# Patient Record
Sex: FEMALE | Race: WHITE | Employment: FULL TIME | URBAN - METROPOLITAN AREA
[De-identification: names, ages, dates, MRNs, and addresses within clinical notes are randomized per-mention and may not be internally consistent; named-entity substitution may affect disease eponyms.]

---

## 2017-04-10 ENCOUNTER — OFFICE VISIT (OUTPATIENT)
Dept: PEDIATRICS | Facility: CLINIC | Age: 52
End: 2017-04-10
Payer: COMMERCIAL

## 2017-04-10 VITALS
OXYGEN SATURATION: 99 % | TEMPERATURE: 97.5 F | SYSTOLIC BLOOD PRESSURE: 120 MMHG | BODY MASS INDEX: 25.99 KG/M2 | HEART RATE: 57 BPM | HEIGHT: 65 IN | WEIGHT: 156 LBS | DIASTOLIC BLOOD PRESSURE: 80 MMHG

## 2017-04-10 DIAGNOSIS — Z11.59 NEED FOR HEPATITIS C SCREENING TEST: ICD-10-CM

## 2017-04-10 DIAGNOSIS — Z13.6 CARDIOVASCULAR SCREENING; LDL GOAL LESS THAN 160: ICD-10-CM

## 2017-04-10 DIAGNOSIS — Z12.11 COLON CANCER SCREENING: ICD-10-CM

## 2017-04-10 DIAGNOSIS — N64.4 BREAST PAIN, RIGHT: ICD-10-CM

## 2017-04-10 DIAGNOSIS — Z00.00 ROUTINE GENERAL MEDICAL EXAMINATION AT A HEALTH CARE FACILITY: Primary | ICD-10-CM

## 2017-04-10 DIAGNOSIS — A60.04 HERPES SIMPLEX VULVOVAGINITIS: ICD-10-CM

## 2017-04-10 DIAGNOSIS — Z13.1 SCREENING FOR DIABETES MELLITUS: ICD-10-CM

## 2017-04-10 LAB
CHOLEST SERPL-MCNC: 167 MG/DL
GLUCOSE SERPL-MCNC: 96 MG/DL (ref 70–99)
HCV AB SERPL QL IA: NORMAL
HDLC SERPL-MCNC: 73 MG/DL
LDLC SERPL CALC-MCNC: 75 MG/DL
NONHDLC SERPL-MCNC: 94 MG/DL
TRIGL SERPL-MCNC: 97 MG/DL

## 2017-04-10 PROCEDURE — 36415 COLL VENOUS BLD VENIPUNCTURE: CPT | Performed by: INTERNAL MEDICINE

## 2017-04-10 PROCEDURE — 99396 PREV VISIT EST AGE 40-64: CPT | Performed by: INTERNAL MEDICINE

## 2017-04-10 PROCEDURE — 82947 ASSAY GLUCOSE BLOOD QUANT: CPT | Performed by: INTERNAL MEDICINE

## 2017-04-10 PROCEDURE — 80061 LIPID PANEL: CPT | Performed by: INTERNAL MEDICINE

## 2017-04-10 PROCEDURE — 86803 HEPATITIS C AB TEST: CPT | Performed by: INTERNAL MEDICINE

## 2017-04-10 RX ORDER — VALACYCLOVIR HYDROCHLORIDE 500 MG/1
500 TABLET, FILM COATED ORAL 2 TIMES DAILY
Qty: 6 TABLET | Refills: 3 | Status: SHIPPED | OUTPATIENT
Start: 2017-04-10 | End: 2019-05-20

## 2017-04-10 NOTE — MR AVS SNAPSHOT
After Visit Summary   4/10/2017    Libra Pena    MRN: [id number]           Patient Information     Date Of Birth          1965        Visit Information        Provider Department      4/10/2017 7:40 AM Carmelina Sims MD [facility]        Today's Diagnoses     Routine general medical examination at a health care facility    -  1    Colon cancer screening        Screening for diabetes mellitus        CARDIOVASCULAR SCREENING; LDL GOAL LESS THAN 160        Need for hepatitis C screening test        Breast pain, right        Herpes simplex vulvovaginitis          Care Instructions    Make appointment(s) for:   -- mammogram and ultrasound  -- get labs done today (can  FIT testing kit at the lab as well)         Medication(s) prescribed today:    Orders Placed This Encounter   Medications     valACYclovir (VALTREX) 500 MG tablet     Sig: Take 1 tablet (500 mg) by mouth 2 times daily     Dispense:  6 tablet     Refill:  3     Please put on file. Do not fill until patient calls.                Preventive Health Recommendations  Female Ages 50 - 64    Yearly exam: See your health care provider every year in order to  o Review health changes.   o Discuss preventive care.    o Review your medicines if your doctor has prescribed any.      Get a Pap test every three years (unless you have an abnormal result and your provider advises testing more often).    If you get Pap tests with HPV test, you only need to test every 5 years, unless you have an abnormal result.     You do not need a Pap test if your uterus was removed (hysterectomy) and you have not had cancer.    You should be tested each year for STDs (sexually transmitted diseases) if you're at risk.     Have a mammogram every 1 to 2 years.    Have a colonoscopy at age 50, or have a yearly FIT test (stool test). These exams screen for colon cancer.      Have a cholesterol test every 5 years, or more often if  advised.    Have a diabetes test (fasting glucose) every three years. If you are at risk for diabetes, you should have this test more often.     If you are at risk for osteoporosis (brittle bone disease), think about having a bone density scan (DEXA).    Shots: Get a flu shot each year. Get a tetanus shot every 10 years.    Nutrition:     Eat at least 5 servings of fruits and vegetables each day.    Eat whole-grain bread, whole-wheat pasta and brown rice instead of white grains and rice.    Talk to your provider about Calcium and Vitamin D.     Lifestyle    Exercise at least 150 minutes a week (30 minutes a day, 5 days a week). This will help you control your weight and prevent disease.    Limit alcohol to one drink per day.    No smoking.     Wear sunscreen to prevent skin cancer.     See your dentist every six months for an exam and cleaning.    See your eye doctor every 1 to 2 years.          Follow-ups after your visit        Future tests that were ordered for you today     Open Future Orders        Priority Expected Expires Ordered    US Breast Right Complete 4 Quadrants Routine  4/10/2018 4/10/2017    MA Diagnostic Digital Bilateral Routine  4/10/2018 4/10/2017    Fecal colorectal cancer screen (FIT) Routine 5/1/2017 7/3/2017 4/10/2017            Who to contact     If you have questions or need follow up information about today's clinic visit or your schedule please contact [facility] directly at [phone].  Normal or non-critical lab and imaging results will be communicated to you by MyChart, letter or phone within 4 business days after the clinic has received the results. If you do not hear from us within 7 days, please contact the clinic through MyChart or phone. If you have a critical or abnormal lab result, we will notify you by phone as soon as possible.  Submit refill requests through [employer] or call your pharmacy and they will forward the refill request to us. Please allow 3  "business days for your refill to be completed.          Additional Information About Your Visit        MyChart Information     [employer] is an electronic gateway that provides easy, online access to your medical records. With [employer], you can request a clinic appointment, read your test results, renew a prescription or communicate with your care team.     To sign up for [employer] visit the website at [email or web address]   You will be asked to enter the access code listed below, as well as some personal information. Please follow the directions to create your username and password.     Your access code is: [email or web address]  Expires: 2017  7:24 AM     Your access code will  in 90 days. If you need help or a new code, please contact your [facility] or call [phone] for assistance.        Care EveryWhere ID     This is your Care EveryWhere ID. This could be used by other organizations to access your [place] medical records  [id number]        Your Vitals Were     Pulse Temperature Height Last Period Pulse Oximetry BMI (Body Mass Index)    57 97.5  F (36.4  C) (Temporal) 5' 4.75\" (1.645 m) 2011 99% 26.16 kg/m2       Blood Pressure from Last 3 Encounters:   04/10/17 120/80   06/04/15 110/76   13 110/70    Weight from Last 3 Encounters:   04/10/17 156 lb (70.8 kg)   06/04/15 155 lb (70.3 kg)   13 159 lb (72.1 kg)              We Performed the Following     Glucose     Hepatitis C antibody     Lipid Profile (Chol, Trig, HDL, LDL calc)          Where to get your medicines      These medications were sent to [facility] [address] [place] [facility] - [place], [place] - [address]  [address] [facility] [address]     Phone:  [phone]     valACYclovir 500 MG tablet          Primary Care Provider Office Phone # Fax #    Carmelina Sims [phone]       [facility] [address]  [facility] [address]        Thank you!     Thank you for choosing [facility]  for your care. Our goal is always to provide you with excellent care. Hearing back from our patients is one way we can continue to improve our services. Please take a few minutes to complete the written survey that you may receive in the mail after your visit with us. Thank you!             Your Updated Medication List - Protect others around you: Learn how to safely use, store and throw away your medicines at www.disposemymeds.org.          This list is accurate as of: 4/10/17  7:58 AM.  Always use your most recent med list.                   Brand Name Dispense Instructions for use    valACYclovir 500 MG tablet    VALTREX    6 tablet    Take 1 tablet (500 mg) by mouth 2 times daily

## 2017-04-10 NOTE — NURSING NOTE
"Chief Complaint   Patient presents with     Physical       Initial /80 (Cuff Size: Adult Regular)  Pulse 57  Temp 97.5  F (36.4  C) (Temporal)  Ht 5' 4.75\" (1.645 m)  Wt 156 lb (70.8 kg)  LMP 12/16/2011  SpO2 99%  BMI 26.16 kg/m2 Estimated body mass index is 26.16 kg/(m^2) as calculated from the following:    Height as of this encounter: 5' 4.75\" (1.645 m).    Weight as of this encounter: 156 lb (70.8 kg).  Medication Reconciliation: complete    "

## 2017-04-10 NOTE — PROGRESS NOTES
SUBJECTIVE:     CC: Libra Pena is an 51 year old woman who presents for preventive health visit.     Right breast pain, intermittent for a few months.     Healthy Habits:    Do you get at least three servings of calcium containing foods daily (dairy, green leafy vegetables, etc.)? yes    Amount of exercise or daily activities, outside of work: 7 day(s) per week    Problems taking medications regularly No    Medication side effects: No    Have you had an eye exam in the past two years? yes    Do you see a dentist twice per year? yes    Do you have sleep apnea, excessive snoring or daytime drowsiness?yes        -------------------------------------    Today's PHQ-2 Score:   PHQ-2 ( 1999 [employer]) 6/4/2015 11/1/2013   Q1: Little interest or pleasure in doing things 0 0   Q2: Feeling down, depressed or hopeless 0 0   PHQ-2 Score 0 0       Abuse: Current or Past(Physical, Sexual or Emotional)- No  Do you feel safe in your environment - Yes    Social History   Substance Use Topics     Smoking status: Never Smoker     Smokeless tobacco: Never Used     Alcohol use Yes      Comment: occassionally      The patient does not drink >3 drinks per day nor >7 drinks per week.    Recent Labs   Lab Test  06/04/15   0939  01/19/12   0939   CHOL  184  185   HDL  65  62   LDL  105  112   TRIG  71  57   CHOLHDLRATIO  2.8  3.0       Reviewed orders with patient.  Reviewed health maintenance and updated orders accordingly - Yes    Mammo Decision Support:  Patient over age 50, mutual decision to screen reflected in health maintenance.    Pertinent mammograms are reviewed under the imaging tab.  History of abnormal Pap smear: NO - age 30-65 PAP every 5 years with negative HPV co-testing recommended    Reviewed and updated as needed this visit by clinical staff  Tobacco  Allergies  Meds  Med Hx  Surg Hx  Fam Hx  Soc Hx        Reviewed and updated as needed this visit by Provider            ROS:  C: NEGATIVE for fever,  "chills, change in weight  I: NEGATIVE for worrisome rashes, moles or lesions  E: NEGATIVE for vision changes or irritation  ENT: NEGATIVE for ear, mouth and throat problems  R: NEGATIVE for significant cough or SOB  B: NEGATIVE for masses, tenderness or discharge  CV: NEGATIVE for chest pain, palpitations or peripheral edema  GI: NEGATIVE for nausea, abdominal pain, heartburn, or change in bowel habits  : NEGATIVE for unusual urinary or vaginal symptoms. No vaginal bleeding.  M: NEGATIVE for significant arthralgias or myalgia  N: NEGATIVE for weakness, dizziness or paresthesias  P: NEGATIVE for changes in mood or affect     Problem list, Medication list, Allergies, and Medical/Social/Surgical histories reviewed in [facility] and updated as appropriate.  OBJECTIVE:     /80 (Cuff Size: Adult Regular)  Pulse 57  Temp 97.5  F (36.4  C) (Temporal)  Ht 5' 4.75\" (1.645 m)  Wt 156 lb (70.8 kg)  LMP 12/16/2011  SpO2 99%  BMI 26.16 kg/m2  EXAM:  GENERAL: healthy, alert and no distress  EYES: Eyes grossly normal to inspection, PERRL and conjunctivae and sclerae normal  HENT: ear canals and TM's normal, nose and mouth without ulcers or lesions  NECK: no adenopathy, no asymmetry, masses, or scars and thyroid normal to palpation  RESP: lungs clear to auscultation - no rales, rhonchi or wheezes  BREAST: normal without masses, tenderness or nipple discharge and no palpable axillary masses or adenopathy  CV: regular rate and rhythm, normal S1 S2, no S3 or S4, no murmur, click or rub, no peripheral edema and peripheral pulses strong  ABDOMEN: soft, nontender, no hepatosplenomegaly, no masses and bowel sounds normal  MS: no gross musculoskeletal defects noted, no edema  SKIN: no suspicious lesions or rashes  NEURO: Normal strength and tone, mentation intact and speech normal  PSYCH: mentation appears normal, affect normal/bright    ASSESSMENT/PLAN:         ICD-10-CM    1. Routine general medical examination at a [facility] " "facility Z00.00    2. Colon cancer screening Z12.11 Fecal colorectal cancer screen (FIT)   3. Screening for diabetes mellitus Z13.1 Glucose   4. CARDIOVASCULAR SCREENING; LDL GOAL LESS THAN 160 Z13.6 Lipid Profile (Chol, Trig, HDL, LDL calc)   5. Need for hepatitis C screening test Z11.59 Hepatitis C antibody   6. Breast pain, right N64.4 MA Diagnostic Digital Bilateral     US Breast Right Complete 4 Quadrants   7. Herpes simplex vulvovaginitis A60.04 valACYclovir (VALTREX) 500 MG tablet       -- intermittent right breast dull ache: normal exam. Diagnostic mammogram and US.     COUNSELING:   Reviewed preventive health counseling, as reflected in patient instructions       Healthy diet/nutrition         reports that she has never smoked. She has never used smokeless tobacco.    Estimated body mass index is 26.16 kg/(m^2) as calculated from the following:    Height as of this encounter: 5' 4.75\" (1.645 m).    Weight as of this encounter: 156 lb (70.8 kg).   Weight management plan: stable.    Counseling Resources:  ATP IV Guidelines  Pooled Cohorts Equation Calculator  Breast Cancer Risk Calculator  FRAX Risk Assessment  ICSI Preventive Guidelines  Dietary Guidelines for Americans, 2010  USDA's MyPlate  ASA Prophylaxis  Lung CA Screening    Carmelina Sims MD, MD  [facility]  "

## 2017-04-10 NOTE — PROGRESS NOTES
Please send normal result letter.         Dear Libra,    The results of your recent tests were normal. Enclosed is a copy of your test results.      If you have additional question, please call or make a follow-up appointment.    Carmelina Sims MD

## 2017-04-10 NOTE — PATIENT INSTRUCTIONS
Make appointment(s) for:   -- mammogram and ultrasound  -- get labs done today (can  FIT testing kit at the lab as well)         Medication(s) prescribed today:    Orders Placed This Encounter   Medications     valACYclovir (VALTREX) 500 MG tablet     Sig: Take 1 tablet (500 mg) by mouth 2 times daily     Dispense:  6 tablet     Refill:  3     Please put on file. Do not fill until patient calls.                Preventive Health Recommendations  Female Ages 50 - 64    Yearly exam: See your health care provider every year in order to  o Review health changes.   o Discuss preventive care.    o Review your medicines if your doctor has prescribed any.      Get a Pap test every three years (unless you have an abnormal result and your provider advises testing more often).    If you get Pap tests with HPV test, you only need to test every 5 years, unless you have an abnormal result.     You do not need a Pap test if your uterus was removed (hysterectomy) and you have not had cancer.    You should be tested each year for STDs (sexually transmitted diseases) if you're at risk.     Have a mammogram every 1 to 2 years.    Have a colonoscopy at age 50, or have a yearly FIT test (stool test). These exams screen for colon cancer.      Have a cholesterol test every 5 years, or more often if advised.    Have a diabetes test (fasting glucose) every three years. If you are at risk for diabetes, you should have this test more often.     If you are at risk for osteoporosis (brittle bone disease), think about having a bone density scan (DEXA).    Shots: Get a flu shot each year. Get a tetanus shot every 10 years.    Nutrition:     Eat at least 5 servings of fruits and vegetables each day.    Eat whole-grain bread, whole-wheat pasta and brown rice instead of white grains and rice.    Talk to your provider about Calcium and Vitamin D.     Lifestyle    Exercise at least 150 minutes a week (30 minutes a day, 5 days a week). This will  help you control your weight and prevent disease.    Limit alcohol to one drink per day.    No smoking.     Wear sunscreen to prevent skin cancer.     See your dentist every six months for an exam and cleaning.    See your eye doctor every 1 to 2 years.

## 2017-04-14 ENCOUNTER — RADIANT APPOINTMENT (OUTPATIENT)
Dept: MAMMOGRAPHY | Facility: CLINIC | Age: 52
End: 2017-04-14
Attending: INTERNAL MEDICINE
Payer: COMMERCIAL

## 2017-04-14 DIAGNOSIS — N64.4 BREAST PAIN, RIGHT: ICD-10-CM

## 2017-04-14 PROCEDURE — G0204 DX MAMMO INCL CAD BI: HCPCS | Performed by: RADIOLOGY

## 2017-04-14 PROCEDURE — G0279 TOMOSYNTHESIS, MAMMO: HCPCS | Performed by: RADIOLOGY

## 2017-06-16 ENCOUNTER — RADIANT APPOINTMENT (OUTPATIENT)
Dept: GENERAL RADIOLOGY | Facility: CLINIC | Age: 52
End: 2017-06-16
Attending: NURSE PRACTITIONER
Payer: COMMERCIAL

## 2017-06-16 ENCOUNTER — OFFICE VISIT (OUTPATIENT)
Dept: PEDIATRICS | Facility: CLINIC | Age: 52
End: 2017-06-16
Payer: COMMERCIAL

## 2017-06-16 VITALS
BODY MASS INDEX: 26.21 KG/M2 | DIASTOLIC BLOOD PRESSURE: 70 MMHG | HEART RATE: 64 BPM | WEIGHT: 156.3 LBS | TEMPERATURE: 97.3 F | OXYGEN SATURATION: 98 % | SYSTOLIC BLOOD PRESSURE: 105 MMHG

## 2017-06-16 DIAGNOSIS — M25.562 LEFT KNEE PAIN, UNSPECIFIED CHRONICITY: Primary | ICD-10-CM

## 2017-06-16 DIAGNOSIS — M25.562 LEFT KNEE PAIN, UNSPECIFIED CHRONICITY: ICD-10-CM

## 2017-06-16 LAB
CRP SERPL-MCNC: 3.1 MG/L (ref 0–8)
ERYTHROCYTE [SEDIMENTATION RATE] IN BLOOD BY WESTERGREN METHOD: 13 MM/H (ref 0–30)

## 2017-06-16 PROCEDURE — 86038 ANTINUCLEAR ANTIBODIES: CPT | Performed by: NURSE PRACTITIONER

## 2017-06-16 PROCEDURE — 86140 C-REACTIVE PROTEIN: CPT | Performed by: NURSE PRACTITIONER

## 2017-06-16 PROCEDURE — 73560 X-RAY EXAM OF KNEE 1 OR 2: CPT | Mod: LT | Performed by: RADIOLOGY

## 2017-06-16 PROCEDURE — 86618 LYME DISEASE ANTIBODY: CPT | Performed by: NURSE PRACTITIONER

## 2017-06-16 PROCEDURE — 85652 RBC SED RATE AUTOMATED: CPT | Performed by: NURSE PRACTITIONER

## 2017-06-16 PROCEDURE — 36415 COLL VENOUS BLD VENIPUNCTURE: CPT | Performed by: NURSE PRACTITIONER

## 2017-06-16 PROCEDURE — 86431 RHEUMATOID FACTOR QUANT: CPT | Performed by: NURSE PRACTITIONER

## 2017-06-16 PROCEDURE — 86200 CCP ANTIBODY: CPT | Performed by: NURSE PRACTITIONER

## 2017-06-16 PROCEDURE — 99213 OFFICE O/P EST LOW 20 MIN: CPT | Performed by: NURSE PRACTITIONER

## 2017-06-16 RX ORDER — NAPROXEN 500 MG/1
500 TABLET ORAL 2 TIMES DAILY PRN
Qty: 30 TABLET | Refills: 1 | Status: SHIPPED | OUTPATIENT
Start: 2017-06-16 | End: 2023-05-02

## 2017-06-16 NOTE — PATIENT INSTRUCTIONS
PLAN:   1.   Symptomatic therapy suggested: .  referral to Orthopedics for this injury, prescription for NSAID given    2.  Orders Placed This Encounter   Medications     naproxen (NAPROSYN) 500 MG tablet     Sig: Take 1 tablet (500 mg) by mouth 2 times daily as needed for moderate pain     Dispense:  30 tablet     Refill:  1     Orders Placed This Encounter   Procedures     XR Knee Left 1/2 Views     Antinuclear antibody screen by EIA     Rheumatoid factor     CRP inflammation     Cyclic Citrullinated Peptide Antibody IgG     Erythrocyte sedimentation rate auto     Lyme Disease Ju with reflex to WB Serum     ORTHOPEDICS ADULT REFERRAL       3. Patient needs to follow up in if no improvement,or sooner if worsening of symptoms or other symptoms develop.  FURTHER TESTING:       - xray knee   Refer orthopedics   Will follow up and/or notify patient on results via phone or mail to determine further need for followup      It was a pleasure seeing you today at the [facility]. Thank you for allowing us to care for you today. We truly hope we provided you with the excellent service you deserve. Please let us know if there is anything else we can do for you so we can be sure you are leaving completley satisfied with your care experience.       General information about your clinic   Clinic Hours Lab Hours (Appointments are required)   Mon-Thurs: 7:30 AM - 7 PM Mon-Thurs: 7:30 AM - 7 PM   Fri: 7:30 AM - 5 PM Fri: 7:30 AM - 5 PM        After Hours Nurse Advise & Appts:  [facility]visors: [phone]  [facility] On Call: to make appointments anytime: [phone] On Call Physician: call [phone] and answering service will page the on call physician.        For urgent appointments, please call [phone] and ask for the triage nurse or your care team clinic nurse.  How to contact my care team:  MyChart: [email or web address]   Phone: [phone]   Fax: [phone]       [facility]  Pharmacy:   Phone: [phone]  Hours: 8:00 AM - 6:00 PM  Medication Refills:  Call your pharmacy and they will forward the refill to us. Please allow 3 business days for your refills to be completed.       Normal or non-critical lab and imaging results will be communicated to you by MyChart, letter or phone within 7 days.  If you do not hear from us within 10 days, please call the clinic. If you have a critical or abnormal lab result, we will notify you by phone as soon as possible.       We now have PWIC (Pediatric Walk in Care)  Monday-Friday from 7:30-4. Simply walk in and be seen for your urgent needs like cough, fever, rash, diarrhea or vomiting, pink eye, UTI. No appointments needed. Ask one of the team for more information      -Your Care Team:    Dr. Carmelina Sims - Internal Medicine/Pediatrics   Dr. Clarissa Callahan - Family Medicine  Dr. Emma Bush - Pediatrics  Chyna Hunter CNP - Family Practice Nurse Practitioner  Dr. Amanda Peterson - Pediatrics  Dr. Yoel Marin - Internal Medicine

## 2017-06-16 NOTE — PROGRESS NOTES
SUBJECTIVE:                                                    Libra Pena is a 51 year old female who presents to clinic today for the following health issues:    Knee swelling     Onset: 3 week    Description:   Location: left knee  Character: Dull ache, burning    Intensity: mild    Progression of Symptoms: better    Accompanying Signs & Symptoms:  Other symptoms: tingling and swelling   History:   Previous similar pain: no       Precipitating factors:   Trauma or overuse: no     Alleviating factors:  Improved by: nothing       Therapies Tried and outcome: none  Was continuously swelling  No injury  Does have connective tissue disorder was diagnosed in the past about 5 years ago  Gave her naproxen prn at that time  No family history of rheumatoid   Felt pain in the medial aspect of the knee.   Bothered her for a week and then got better and then suddenly was so swollen could hardly see the knee cap and now is better again.   Use increases pain and swelling.   Has had some sensation of instability but has not gone out from under her yet     Problem list and histories reviewed & adjusted, as indicated.  Additional history: as documented    Patient Active Problem List   Diagnosis     Herpes simplex vulvovaginitis     CARDIOVASCULAR SCREENING; LDL GOAL LESS THAN 160     Postmenopausal bleeding     Ganglion cyst     Family history of coronary artery disease     Overweight (BMI 25.0-29.9)     Past Surgical History:   Procedure Laterality Date     APPENDECTOMY         Social History   Substance Use Topics     Smoking status: Never Smoker     Smokeless tobacco: Never Used     Alcohol use Yes      Comment: occassionally      Family History   Problem Relation Age of Onset     Hypertension Mother      and brother     CEREBROVASCULAR DISEASE Mother      Arthritis Father      C.A.D. Brother 50     CABG at age 50         Current Outpatient Prescriptions   Medication Sig Dispense Refill     valACYclovir (VALTREX)  500 MG tablet Take 1 tablet (500 mg) by mouth 2 times daily 6 tablet 3     No Known Allergies    Reviewed and updated as needed this visit by clinical staff  Tobacco  Allergies  Meds  Med Hx  Surg Hx  Fam Hx  Soc Hx      Reviewed and updated as needed this visit by Provider         ROS:  Constitutional, HEENT, cardiovascular, pulmonary, gi and gu systems are negative, except as otherwise noted.    OBJECTIVE:                                                    /70 (BP Location: Right arm, Patient Position: Sitting, Cuff Size: Adult Regular)  Pulse 64  Temp 97.3  F (36.3  C) (Temporal)  Wt 156 lb 4.8 oz (70.9 kg)  LMP 12/16/2011  SpO2 98%  Breastfeeding? No  BMI 26.21 kg/m2  Body mass index is 26.21 kg/(m^2).  GENERAL APPEARANCE: alert, active and no distress  RESP: lungs clear to auscultation - no rales, rhonchi or wheezes  CV: regular rates and rhythm and no murmur, click or rub  MS: extremities normal- no gross deformities noted and peripheral pulses normal  ORTHO: Knee exam: left positive for moderate crepitations, some mild tenderness and pain on range of motion, no effusion is present, no pseudolaxity noted.  SKIN: no suspicious lesions or rashes  PSYCH: mentation appears normal and affect normal/bright    Diagnostic Test Results:  Recent Results (from the past 744 hour(s))   XR Knee Left 1/2 Views    Narrative    2 views left knee radiographs 6/16/2017 1:20 PM    History: Pain in left knee    Additional History from EMR: Left knee pain since last 3 weeks, no  history of injury    Comparison: None    Findings:    AP and lateral views of the left knee were obtained.     No acute osseous abnormality. Moderate joint effusion. Marginal  osteophytosis involving the femoral and tibial articular surface. No  joint space narrowing.    No substantial degenerative change.    No patellar tilt or lateral subluxation.  Soft tissue is unremarkable.      Impression    Impression:  1. Moderate joint  effusion  2. Marginal osteophytosis involving the femoral and tibial articular  surface.  .    I have personally reviewed the examination and initial interpretation  and I agree with the findings.    JUTTA ELLERMANN, MD          ASSESSMENT/PLAN:                                                      Libra was seen today for knee pain.    Diagnoses and all orders for this visit:    Left knee pain, unspecified chronicity  -     XR Knee Left 1/2 Views; Future  -     Antinuclear antibody screen by EIA  -     Rheumatoid factor  -     CRP inflammation  -     Cyclic Citrullinated Peptide Antibody IgG  -     Erythrocyte sedimentation rate auto  -     Lyme Disease Ju with reflex to WB Serum  -     ORTHOPEDICS ADULT REFERRAL  -     naproxen (NAPROSYN) 500 MG tablet; Take 1 tablet (500 mg) by mouth 2 times daily as needed for moderate pain  PLAN:   Symptomatic therapy suggested: .  referral to Orthopedics for this injury, prescription for NSAID given  Patient needs to follow up in if no improvement,or sooner if worsening of symptoms or other symptoms develop.  FURTHER TESTING:       - xray knee   Refer orthopedics   Will follow up and/or notify patient on results via phone or mail to determine further need for followup      See Patient Instructions    BENNIE Castillo [facility]

## 2017-06-16 NOTE — MR AVS SNAPSHOT
After Visit Summary   6/16/2017    Libra Pena    MRN: [id number]           Patient Information     Date Of Birth          1965        Visit Information        Provider Department      6/16/2017 11:00 AM Chyna Hunter APRN CNP [facility] [[email or web address]]        Today's Diagnoses     Left knee pain, unspecified chronicity    -  1      Care Instructions    PLAN:   1.   Symptomatic therapy suggested: .  referral to Orthopedics for this injury, prescription for NSAID given    2.  Orders Placed This Encounter   Medications     naproxen (NAPROSYN) 500 MG tablet     Sig: Take 1 tablet (500 mg) by mouth 2 times daily as needed for moderate pain     Dispense:  30 tablet     Refill:  1     Orders Placed This Encounter   Procedures     XR Knee Left 1/2 Views     Antinuclear antibody screen by EIA     Rheumatoid factor     CRP inflammation     Cyclic Citrullinated Peptide Antibody IgG     Erythrocyte sedimentation rate auto     Lyme Disease Ju with reflex to WB Serum     ORTHOPEDICS ADULT REFERRAL       3. Patient needs to follow up in if no improvement,or sooner if worsening of symptoms or other symptoms develop.  FURTHER TESTING:       - xray knee   Refer orthopedics   Will follow up and/or notify patient on results via phone or mail to determine further need for followup      It was a pleasure seeing you today at the [facility]. Thank you for allowing us to care for you today. We truly hope we provided you with the excellent service you deserve. Please let us know if there is anything else we can do for you so we can be sure you are leaving completley satisfied with your care experience.       General information about your clinic   Clinic Hours Lab Hours (Appointments are required)   Mon-Thurs: 7:30 AM - 7 PM Mon-Thurs: 7:30 AM - 7 PM   Fri: 7:30 AM - 5 PM Fri: 7:30 AM - 5 PM        After Hours Nurse Advise & Appts:  [facility]:  [phone]  [facility] On Call: to make appointments anytime: [phone] On Call Physician: call [phone] and answering service will page the on call physician.        For urgent appointments, please call [phone] and ask for the triage nurse or your care team clinic nurse.  How to contact my care team:  MyChart: [email or web address]   Phone: [phone]   Fax: [phone]       [facility]:   Phone: [phone]  Hours: 8:00 AM - 6:00 PM  Medication Refills:  Call your pharmacy and they will forward the refill to us. Please allow 3 business days for your refills to be completed.       Normal or non-critical lab and imaging results will be communicated to you by MyChart, letter or phone within 7 days.  If you do not hear from us within 10 days, please call the clinic. If you have a critical or abnormal lab result, we will notify you by phone as soon as possible.       We now have PWIC (Pediatric Walk in Care)  Monday-Friday from 7:30-4. Simply walk in and be seen for your urgent needs like cough, fever, rash, diarrhea or vomiting, pink eye, UTI. No appointments needed. Ask one of the team for more information      -Your Care Team:    Dr. Carmelina Sims - Internal Medicine/Pediatrics   Dr. Clarissa Callahan - Family Medicine  Dr. Emma Bush - Pediatrics  Chyna Hunter CNP - Family Practice Nurse Practitioner  Dr. Amanda Peterson - Pediatrics  Dr. Yoel Marin - Internal Medicine                  Follow-ups after your visit        Additional Services     ORTHOPEDICS ADULT REFERRAL       Your provider has referred you to: [facility]: [facility] [phone]   [email or web address]    Please be aware that coverage of these services is subject to the terms and limitations of your health insurance plan.  Call member services at your health plan with any benefit or coverage  questions.      Please bring the following to your appointment:    >>   Any x-rays, CTs or MRIs which have been performed.  Contact the facility where they were done to arrange for  prior to your scheduled appointment.    >>   List of current medications   >>   This referral request   >>   Any documents/labs given to you for this referral                  Future tests that were ordered for you today     Open Future Orders        Priority Expected Expires Ordered    XR Knee Left 1/2 Views Routine 2017            Who to contact     If you have questions or need follow up information about today's clinic visit or your schedule please contact [facility] directly at [phone].  Normal or non-critical lab and imaging results will be communicated to you by [employer], letter or phone within 4 business days after the clinic has received the results. If you do not hear from us within 7 days, please contact the clinic through [employer] or phone. If you have a critical or abnormal lab result, we will notify you by phone as soon as possible.  Submit refill requests through [employer] or call your pharmacy and they will forward the refill request to us. Please allow 3 business days for your refill to be completed.          Additional Information About Your Visit        [employer] Information     [employer] is an electronic gateway that provides easy, online access to your medical records. With [employer], you can request a clinic appointment, read your test results, renew a prescription or communicate with your care team.     To sign up for [employer] visit the website at [email or web address]   You will be asked to enter the access code listed below, as well as some personal information. Please follow the directions to create your username and password.     Your access code is: [email or web address]  Expires: 2017  7:24 AM     Your access code will  in 90 days. If you need help or a new  code, please contact your [facility] or call [phone] for assistance.        Care EveryWhere ID     This is your Care EveryWhere ID. This could be used by other organizations to access your [facility] medical records  [id number]        Your Vitals Were     Pulse Temperature Last Period Pulse Oximetry Breastfeeding? BMI (Body Mass Index)    64 97.3  F (36.3  C) (Temporal) 12/16/2011 98% No 26.21 kg/m2       Blood Pressure from Last 3 Encounters:   06/16/17 105/70   04/10/17 120/80   06/04/15 110/76    Weight from Last 3 Encounters:   06/16/17 156 lb 4.8 oz (70.9 kg)   04/10/17 156 lb (70.8 kg)   06/04/15 155 lb (70.3 kg)              We Performed the Following     Antinuclear antibody screen by EIA     CRP inflammation     Cyclic Citrullinated Peptide Antibody IgG     Erythrocyte sedimentation rate auto     Lyme Disease Ju with reflex to WB Serum     ORTHOPEDICS ADULT REFERRAL     Rheumatoid factor          Today's Medication Changes          These changes are accurate as of: 6/16/17 11:26 AM.  If you have any questions, ask your nurse or doctor.               Start taking these medicines.        Dose/Directions    naproxen 500 MG tablet   Commonly known as:  NAPROSYN   Used for:  Left knee pain, unspecified chronicity   Started by:  Chyna Hunter APRN CNP        Dose:  500 mg   Take 1 tablet (500 mg) by mouth 2 times daily as needed for moderate pain   Quantity:  30 tablet   Refills:  1            Where to get your medicines      These medications were sent to [facility] [address] [place] [id number]  [address] [place] [address]     Phone:  [phone]     naproxen 500 MG tablet                Primary Care Provider Office Phone # Fax #    Carmelina Sims [phone]       [facility] [address]  [facility] [address]        Thank you!     Thank you for choosing [facility]  for your care. Our goal is always  to provide you with excellent care. Hearing back from our patients is one way we can continue to improve our services. Please take a few minutes to complete the written survey that you may receive in the mail after your visit with us. Thank you!             Your Updated Medication List - Protect others around you: Learn how to safely use, store and throw away your medicines at www.disposemymeds.org.          This list is accurate as of: 6/16/17 11:26 AM.  Always use your most recent med list.                   Brand Name Dispense Instructions for use    naproxen 500 MG tablet    NAPROSYN    30 tablet    Take 1 tablet (500 mg) by mouth 2 times daily as needed for moderate pain       valACYclovir 500 MG tablet    VALTREX    6 tablet    Take 1 tablet (500 mg) by mouth 2 times daily

## 2017-06-16 NOTE — NURSING NOTE
"Chief Complaint   Patient presents with     Knee Pain     left knee swelling with slight pain x 3 weeks       Initial /70 (BP Location: Right arm, Patient Position: Sitting, Cuff Size: Adult Regular)  Pulse 64  Temp 97.3  F (36.3  C) (Temporal)  Wt 156 lb 4.8 oz (70.9 kg)  LMP 12/16/2011  SpO2 98%  Breastfeeding? No  BMI 26.21 kg/m2 Estimated body mass index is 26.21 kg/(m^2) as calculated from the following:    Height as of 4/10/17: 5' 4.75\" (1.645 m).    Weight as of this encounter: 156 lb 4.8 oz (70.9 kg).  Medication Reconciliation: complete      SHIRLENE Martini      "

## 2017-06-17 LAB
B BURGDOR IGG+IGM SER QL: 0.11 (ref 0–0.89)
RHEUMATOID FACT SER NEPH-ACNC: <20 IU/ML (ref 0–20)

## 2017-06-19 ENCOUNTER — TELEPHONE (OUTPATIENT)
Dept: PEDIATRICS | Facility: CLINIC | Age: 52
End: 2017-06-19

## 2017-06-19 LAB — ANA SER QL IA: NORMAL

## 2017-06-19 NOTE — TELEPHONE ENCOUNTER
Patient advised of information below per Nani Hunter.  Patient stated understanding. Patient states she has an appt with orthopedics tomorrow.  Patient was confused by the rheumatoid factor result because it states ref range <20 and her result is <20 so does she have RHEUMATOID arthritis?  Advised patient I will ask Nani Hunter and call her back.    Gilma Rosario CMA

## 2017-06-19 NOTE — TELEPHONE ENCOUNTER
Notes Recorded by Chyna Hunter APRN CNP on 6/17/2017 at 11:25 PM  Please let patient know has fluid on the joint and signs of arthritis   Will refer to orthopedics  Please call [phone] to make appointment  if you do not hear from referrals in the next few days.

## 2017-06-19 NOTE — PROGRESS NOTES
Judson Pena,    Attached are your test results.  Arthritis inflammatory markers are all negative    Please contact us if you have any questions.    Chyna Hunter, CNP

## 2017-06-19 NOTE — TELEPHONE ENCOUNTER
Her rheumatoid marker was negative as normal is less than 20 and her marker was not elevated which would be greater than the 20

## 2017-06-20 ENCOUNTER — OFFICE VISIT (OUTPATIENT)
Dept: ORTHOPEDICS | Facility: CLINIC | Age: 52
End: 2017-06-20
Attending: NURSE PRACTITIONER
Payer: COMMERCIAL

## 2017-06-20 VITALS
DIASTOLIC BLOOD PRESSURE: 66 MMHG | HEART RATE: 57 BPM | SYSTOLIC BLOOD PRESSURE: 115 MMHG | HEIGHT: 65 IN | BODY MASS INDEX: 25.99 KG/M2 | WEIGHT: 156 LBS

## 2017-06-20 DIAGNOSIS — S83.242A TEAR OF MEDIAL MENISCUS OF LEFT KNEE, CURRENT, UNSPECIFIED TEAR TYPE, INITIAL ENCOUNTER: Primary | ICD-10-CM

## 2017-06-20 PROCEDURE — 99213 OFFICE O/P EST LOW 20 MIN: CPT | Performed by: PREVENTIVE MEDICINE

## 2017-06-20 ASSESSMENT — PAIN SCALES - GENERAL: PAINLEVEL: MILD PAIN (2)

## 2017-06-20 NOTE — PATIENT INSTRUCTIONS
Thanks for coming today.  Ortho/Sports Medicine Clinic  [address] [place] [address]    To schedule future appointments in Ortho Clinic, you may call [phone].    To schedule ordered imaging by your Provider: Call [facility] at [phone]    Care[employer]tend available online at:   [email or web address]    Please call if any further questions or concerns [phone] and ask for the Orthopedic Department. Clinic hours 8 am to 5 pm.    Return to clinic if symptoms worsen.

## 2017-06-20 NOTE — MR AVS SNAPSHOT
After Visit Summary   6/20/2017    Libra Pena    MRN: [id number]           Patient Information     Date Of Birth          1965        Visit Information        Provider Department      6/20/2017 7:20 AM Maurice Chaudhry MD [facility]        Today's Diagnoses     Tear of medial meniscus of left knee, current, unspecified tear type, initial encounter    -  1      Care Instructions    Thanks for coming today.  Ortho/Sports Medicine Clinic  [address] [place] [address]    To schedule future appointments in Ortho Clinic, you may call [phone].    To schedule ordered imaging by your Provider: Call [facility] at [phone]    [facility]agon available online at:   [email or web address]    Please call if any further questions or concerns [phone] and ask for the Orthopedic Department. Clinic hours 8 am to 5 pm.    Return to clinic if symptoms worsen.          Follow-ups after your visit        Who to contact     If you have questions or need follow up information about today's clinic visit or your schedule please contact [facility] directly at [phone].  Normal or non-critical lab and imaging results will be communicated to you by [employer], letter or phone within 4 business days after the clinic has received the results. If you do not hear from us within 7 days, please contact the clinic through [employer] or phone. If you have a critical or abnormal lab result, we will notify you by phone as soon as possible.  Submit refill requests through [employer] or call your pharmacy and they will forward the refill request to us. Please allow 3 business days for your refill to be completed.          Additional Information About Your Visit        MyChart Information     [employer] gives you secure access to your electronic health record. If you see a primary care provider, you can also send messages to your care team and make appointments.  "If you have questions, please call your primary care clinic.  If you do not have a primary care provider, please call [phone] and they will assist you.      Dr. TATTOFF is an electronic gateway that provides easy, online access to your medical records. With Dr. TATTOFF, you can request a clinic appointment, read your test results, renew a prescription or communicate with your care team.     To access your existing account, please contact your [facility] or call [phone] for assistance.        Care EveryWhere ID     This is your Care EveryWhere ID. This could be used by other organizations to access your [facility] medical records  [id number]        Your Vitals Were     Pulse Height Last Period BMI (Body Mass Index)          57 1.645 m (5' 4.75\") 12/16/2011 26.16 kg/m2         Blood Pressure from Last 3 Encounters:   06/20/17 115/66   06/16/17 105/70   04/10/17 120/80    Weight from Last 3 Encounters:   06/20/17 70.8 kg (156 lb)   06/16/17 70.9 kg (156 lb 4.8 oz)   04/10/17 70.8 kg (156 lb)              Today, you had the following     No orders found for display       Primary Care Provider Office Phone # Fax #    Carmelina Sims [phone]       [facility] [address]  [facility] [address]        Equal Access to Services     [facility] : Hadii aad ku hadasho Soomaali, waaxda luqadaha, qaybta kaalmada adeegyada, chirag soto . So [facility] [phone].    ATENCIÓN: Si habla español, tiene a torres disposición servicios gratuitos de asistencia lingüística. Llame al [phone].    We comply with applicable federal civil rights laws and [place] laws. We do not discriminate on the basis of race, color, national origin, age, disability sex, sexual orientation or gender identity.            Thank you!     Thank you for choosing [facility]  for your care. Our goal is always to provide you with excellent care. Hearing back " from our patients is one way we can continue to improve our services. Please take a few minutes to complete the written survey that you may receive in the mail after your visit with us. Thank you!             Your Updated Medication List - Protect others around you: Learn how to safely use, store and throw away your medicines at www.disposemymeds.org.          This list is accurate as of: 6/20/17 11:59 PM.  Always use your most recent med list.                   Brand Name Dispense Instructions for use Diagnosis    naproxen 500 MG tablet    NAPROSYN    30 tablet    Take 1 tablet (500 mg) by mouth 2 times daily as needed for moderate pain    Left knee pain, unspecified chronicity       valACYclovir 500 MG tablet    VALTREX    6 tablet    Take 1 tablet (500 mg) by mouth 2 times daily    Herpes simplex vulvovaginitis

## 2017-06-20 NOTE — PROGRESS NOTES
HISTORY OF PRESENT ILLNESS  Ms. Pena is a pleasant 51 year old year old female who presents to clinic today with 4 weeks of left knee pain and swelling.  Libra explains that during the first week her knee was so swollen that it caused severe pain to try to bend it, and the swelling has been decreasing since then. X-rays done last week showed no irregularities.   She reported that she had medial pain in her left knee for a few days after doing work that involved adduction of the left leg a few months ago but the pain resolved prior to the current episode and did not involve swelling at that time.  Location: left knee  Quality:  achy pain    Severity: 5/10 at worst    Duration: minutes to hours  Timing: occurs intermittently  Context: occurs while walking or climbing stairs  Modifying factors:  resting and non-use makes it better, movement and use makes it worse  Associated signs & symptoms: swelling  Previous similar pain: yes  Exercise: lifting weights and cardiovascular on machine  Additional history: as documented    MEDICAL HISTORY  Patient Active Problem List   Diagnosis     Herpes simplex vulvovaginitis     CARDIOVASCULAR SCREENING; LDL GOAL LESS THAN 160     Postmenopausal bleeding     Ganglion cyst     Family history of coronary artery disease     Overweight (BMI 25.0-29.9)       Current Outpatient Prescriptions   Medication Sig Dispense Refill     naproxen (NAPROSYN) 500 MG tablet Take 1 tablet (500 mg) by mouth 2 times daily as needed for moderate pain 30 tablet 1     valACYclovir (VALTREX) 500 MG tablet Take 1 tablet (500 mg) by mouth 2 times daily 6 tablet 3       No Known Allergies    Family History   Problem Relation Age of Onset     Hypertension Mother      and brother     CEREBROVASCULAR DISEASE Mother      Arthritis Father      C.A.D. Brother 50     CABG at age 50       Additional medical/Social/Surgical histories reviewed in [facility] and updated as appropriate.     REVIEW OF SYSTEMS  "(6/20/2017)  10 point ROS of systems including Constitutional, Eyes, Respiratory, Cardiovascular, Gastroenterology, Genitourinary, Integumentary, Musculoskeletal, Psychiatric were all negative except for pertinent positives noted in my HPI.     PHYSICAL EXAM  Vitals:    06/20/17 0718   BP: 115/66   Pulse: 57   Weight: 70.8 kg (156 lb)   Height: 1.645 m (5' 4.75\")     Vital Signs: /66  Pulse 57  Ht 1.645 m (5' 4.75\")  Wt 70.8 kg (156 lb)  LMP 12/16/2011  BMI 26.16 kg/m2 Patient declined being weighed. Body mass index is 26.16 kg/(m^2).    General  - normal appearance, in no obvious distress  CV  - normal popliteal pulse  Pulm  - normal respiratory pattern, non-labored  Musculoskeletal - left knee  - stance: mildly antalgic gait  - inspection: minimal swelling, trace effusion; no erythema or obvious deformity  - palpation: medial joint line tenderness, patella and patellar tendon non-tender, normal popliteal pulse  - ROM: 100 degrees flexion, 0 degrees extension, painful active ROM  - strength: 5/5 in flexion, 5/5 in extension  - neuro: no sensory or motor deficit  - special tests:  (-) Lachman  (-) anterior drawer  (-) posterior drawer  (-) pivot shift  (+) Benito  (-) Thessaly  (-) varus at 0 and 30 degrees flexion  (-) valgus at 0 and 30 degrees flexion  (-) Alfredo s compression test  (-) patellar apprehension  Neuro  - no sensory or motor deficit, grossly normal coordination, normal muscle tone  Skin  - no ecchymosis, erythema, warmth, or induration, no obvious rash  Psych  - interactive, appropriate, normal mood and affect    ASSESSMENT & PLAN  50 yo female with left knee pain and swelling secondary to left medial meniscus tear.  - tylenol and naproxen  -ice PRN  Consider HEP  RTC in a few weeks and will consider injection and PT  Maurice Chaudhry MD, CAQSM    "

## 2017-06-20 NOTE — NURSING NOTE
"Libra Pena's goals for this visit include: Left knee evaluation. Find solution for swelling in knee which has been present for about 1 month.   She requests these members of her care team be copied on today's visit information: Yes    PCP: Carmelina Sims    Referring Provider:  Chyna Hunter, BENNIE [facility]  [address]  [place] [address]    Chief Complaint   Patient presents with     Knee Pain     Left knee pain swelling about 1 month ago. Reporting that swelling has decreased slightly.        Initial /66  Pulse 57  Ht 1.645 m (5' 4.75\")  Wt 70.8 kg (156 lb)  LMP 12/16/2011  BMI 26.16 kg/m2 Estimated body mass index is 26.16 kg/(m^2) as calculated from the following:    Height as of this encounter: 1.645 m (5' 4.75\").    Weight as of this encounter: 70.8 kg (156 lb).  Medication Reconciliation: complete  "

## 2017-06-21 LAB — CCP AB SER IA-ACNC: 1 U/ML

## 2017-11-07 ENCOUNTER — PRE VISIT (OUTPATIENT)
Dept: RHEUMATOLOGY | Facility: CLINIC | Age: 52
End: 2017-11-07

## 2017-11-07 NOTE — TELEPHONE ENCOUNTER
Called patient and left message at cell phone voicemail re: previsit intake for appt Thursday 11/9/17 with Dr Spann.

## 2017-11-09 ENCOUNTER — OFFICE VISIT (OUTPATIENT)
Dept: RHEUMATOLOGY | Facility: CLINIC | Age: 52
End: 2017-11-09
Payer: COMMERCIAL

## 2017-11-09 VITALS
SYSTOLIC BLOOD PRESSURE: 106 MMHG | HEART RATE: 51 BPM | TEMPERATURE: 97.9 F | OXYGEN SATURATION: 99 % | HEIGHT: 65 IN | BODY MASS INDEX: 25.79 KG/M2 | RESPIRATION RATE: 18 BRPM | WEIGHT: 154.8 LBS | DIASTOLIC BLOOD PRESSURE: 69 MMHG

## 2017-11-09 DIAGNOSIS — M25.562 ACUTE PAIN OF LEFT KNEE: ICD-10-CM

## 2017-11-09 DIAGNOSIS — D70.8 OTHER NEUTROPENIA (H): Primary | ICD-10-CM

## 2017-11-09 LAB
CRP SERPL-MCNC: <2.9 MG/L (ref 0–8)
ERYTHROCYTE [SEDIMENTATION RATE] IN BLOOD BY WESTERGREN METHOD: 10 MM/H (ref 0–30)

## 2017-11-09 PROCEDURE — 36415 COLL VENOUS BLD VENIPUNCTURE: CPT | Performed by: STUDENT IN AN ORGANIZED HEALTH CARE EDUCATION/TRAINING PROGRAM

## 2017-11-09 PROCEDURE — 99214 OFFICE O/P EST MOD 30 MIN: CPT | Performed by: STUDENT IN AN ORGANIZED HEALTH CARE EDUCATION/TRAINING PROGRAM

## 2017-11-09 PROCEDURE — 86140 C-REACTIVE PROTEIN: CPT | Performed by: STUDENT IN AN ORGANIZED HEALTH CARE EDUCATION/TRAINING PROGRAM

## 2017-11-09 PROCEDURE — 86038 ANTINUCLEAR ANTIBODIES: CPT | Performed by: STUDENT IN AN ORGANIZED HEALTH CARE EDUCATION/TRAINING PROGRAM

## 2017-11-09 PROCEDURE — 86160 COMPLEMENT ANTIGEN: CPT | Performed by: STUDENT IN AN ORGANIZED HEALTH CARE EDUCATION/TRAINING PROGRAM

## 2017-11-09 PROCEDURE — 85652 RBC SED RATE AUTOMATED: CPT | Performed by: STUDENT IN AN ORGANIZED HEALTH CARE EDUCATION/TRAINING PROGRAM

## 2017-11-09 PROCEDURE — 99000 SPECIMEN HANDLING OFFICE-LAB: CPT | Performed by: STUDENT IN AN ORGANIZED HEALTH CARE EDUCATION/TRAINING PROGRAM

## 2017-11-09 PROCEDURE — 86255 FLUORESCENT ANTIBODY SCREEN: CPT | Mod: 90 | Performed by: STUDENT IN AN ORGANIZED HEALTH CARE EDUCATION/TRAINING PROGRAM

## 2017-11-09 PROCEDURE — 82164 ANGIOTENSIN I ENZYME TEST: CPT | Mod: 90 | Performed by: STUDENT IN AN ORGANIZED HEALTH CARE EDUCATION/TRAINING PROGRAM

## 2017-11-09 PROCEDURE — 82595 ASSAY OF CRYOGLOBULIN: CPT | Performed by: STUDENT IN AN ORGANIZED HEALTH CARE EDUCATION/TRAINING PROGRAM

## 2017-11-09 ASSESSMENT — PAIN SCALES - GENERAL: PAINLEVEL: NO PAIN (0)

## 2017-11-09 NOTE — MR AVS SNAPSHOT
After Visit Summary   11/9/2017    Libra Pena    MRN: [id number]           Patient Information     Date Of Birth          1965        Visit Information        Provider Department      11/9/2017 2:00 PM Shantell Spann MD [facility]        Today's Diagnoses     Other neutropenia (H)    -  1    Acute pain of left knee          Care Instructions    -- Blood tests today for Connective tissue diseases and vasculitis    -- Will review your labs    -- If blood work comes back normal , you can get MRI of knee and possible PT    -- RTC in 2 months             Follow-ups after your visit        Your next 10 appointments already scheduled     Jan 12, 2018 11:30 AM CST   Return Visit with Shantell Spann MD   [facility] ([facility])    [id number] [place] [id number] [phone]              Who to contact     If you have questions or need follow up information about today's clinic visit or your schedule please contact [facility] directly at [phone].  Normal or non-critical lab and imaging results will be communicated to you by MyChart, letter or phone within 4 business days after the clinic has received the results. If you do not hear from us within 7 days, please contact the clinic through [employer] or phone. If you have a critical or abnormal lab result, we will notify you by phone as soon as possible.  Submit refill requests through [employer] or call your pharmacy and they will forward the refill request to us. Please allow 3 business days for your refill to be completed.          Additional Information About Your Visit        [employer] Information     [employer]Room gives you secure access to your electronic health record. If you see a primary care provider, you can also send messages to your care team and make appointments. If you have questions, please call your primary care clinic.  If you do not have a  "primary care provider, please call [phone] and they will assist you.      [employer] is an electronic gateway that provides easy, online access to your medical records. With [employer], you can request a clinic appointment, read your test results, renew a prescription or communicate with your care team.     To access your existing account, please contact your [facility] or call [phone] for assistance.        Care EveryWhere ID     This is your Care EveryWhere ID. This could be used by other organizations to access your [place] medical records  [id number]        Your Vitals Were     Pulse Temperature Respirations Height Last Period Pulse Oximetry    51 97.9  F (36.6  C) 18 1.638 m (5' 4.5\") 12/16/2011 99%    BMI (Body Mass Index)                   26.16 kg/m2            Blood Pressure from Last 3 Encounters:   11/09/17 106/69   06/20/17 115/66   06/16/17 105/70    Weight from Last 3 Encounters:   11/09/17 70.2 kg (154 lb 12.8 oz)   06/20/17 70.8 kg (156 lb)   06/16/17 70.9 kg (156 lb 4.8 oz)              We Performed the Following     Angiotensin converting enzyme     Anti Nuclear Ju IgG by IFA with Reflex     Antineutrophil cytoplasmic Ju IgG     Complement C3     Complement C4     CRP inflammation     Cryoglobulin quantitative     ESR        Primary Care Provider Office Phone # Fax #    Carmelina Sims MD PhD [phone]       [address]  [facility] [address]        Equal Access to Services     JESSICA CARTER : Hadii aad ku hadasho Soomaali, waaxda luqadaha, qaybta kaalmada adeegyabrandon, waxja sandy soto . So [facility] [phone].    ATENCIÓN: Si habla español, tiene a torres disposición servicios gratuitos de asistencia lingüística. Llame al [phone].    We comply with applicable federal civil rights laws and [place] laws. We do not discriminate on the basis of race, color, national origin, age, disability, sex, sexual orientation, or gender " identity.            Thank you!     Thank you for choosing [facility]  for your care. Our goal is always to provide you with excellent care. Hearing back from our patients is one way we can continue to improve our services. Please take a few minutes to complete the written survey that you may receive in the mail after your visit with us. Thank you!             Your Updated Medication List - Protect others around you: Learn how to safely use, store and throw away your medicines at www.disposemymeds.org.          This list is accurate as of: 11/9/17  2:51 PM.  Always use your most recent med list.                   Brand Name Dispense Instructions for use Diagnosis    naproxen 500 MG tablet    NAPROSYN    30 tablet    Take 1 tablet (500 mg) by mouth 2 times daily as needed for moderate pain    Left knee pain, unspecified chronicity       valACYclovir 500 MG tablet    VALTREX    6 tablet    Take 1 tablet (500 mg) by mouth 2 times daily    Herpes simplex vulvovaginitis

## 2017-11-09 NOTE — NURSING NOTE
"Libra Pena's goals for this visit include:   She requests these members of her care team be copied on today's visit information:    PCP: Carmelina Sims    Referring Provider:  Yusef Chery MD  [facility]  [address] [address]    Chief Complaint   Patient presents with     Consult     mixed connective tissue       Initial /69 (Patient Position: Sitting, Cuff Size: Adult Regular)  Pulse 51  Temp 97.9  F (36.6  C)  Resp 18  Ht 1.638 m (5' 4.5\")  Wt 70.2 kg (154 lb 12.8 oz)  LMP 12/16/2011  SpO2 99%  BMI 26.16 kg/m2 Estimated body mass index is 26.16 kg/(m^2) as calculated from the following:    Height as of this encounter: 1.638 m (5' 4.5\").    Weight as of this encounter: 70.2 kg (154 lb 12.8 oz).  Medication Reconciliation: complete    Do you need any medication refills at today's visit? No    Chief Complaint   Patient presents with     Consult     mixed connective tissue         "

## 2017-11-09 NOTE — PROGRESS NOTES
Rheumatology Clinic Visit     Libra Pena MRN# [id number]   YOB: 1965 Age: 52 year old     Date of Visit: November 9, 2017  Primary care provider: Carmelina Sims          Assessment and Plan:   Assessment     -- Left knee pain and effusion  -- Chronic leukopenia  -- Random skin bruising over her hands and feet  -- GERD  -- Nodules over PIP joints    Ms Pena is a 52-year-old female seen in clinic for evaluation of possible connective tissue disease. 4 months ago she developed acute right knee pain and swelling which is getting better now. No other joints are involved denies any morning stiffness. On physical exam she does have moderate effusion in the left knee. She has skin nodules over few PIP joints which looks like possible rheumatoid nodules but her Rheumatoid serologies recently checked are negative and she does not have symmetric polyarticular arthritis suggestive of rheumatoid arthritis either. These  lesions are not suggestive of tophi or Ector nodes.     She has chronic leukopenia, was seen by hematologist at [facility]. All her workup was normal including protein electrophoresis, HIV, hepatitis panel, vitamin B12, folate and CMP except for white count of 3.9. She was recommended to get Rheumatology evaluation for possible underlying connective tissue disease. They did not recommend to get a bone marrow biopsy.     She reports history of random swelling and bruising of her fingers for the last 15 years and now for the first time happened in her feet that made her concerned. I saw her picture and the lesion looked like bruise. Not sure why she gets these lesions but it is not a typical presentation for vasculitis or inflammatory arthritis. I will get ANCA titers and cryoglobulins.     Physical exam is benign except for left knee effusion. She does not have Raynaud's, pleurisy, sicca symptoms, oral mucosal ulcers, fever, fatigue or morning stiffness. She has negative rheumatoid  serologies, normal inflammatory markers and negative ALLYSON by VIDHI.     I will get ALLYSON by immunofluorescence which is more sensitive. For the left knee pain, MRI and physical therapy can be considered. In case knee pain does not improve with conservative measures like NSAIDs and physical therapy trial of prednisone can be done.     Plan    -- Blood tests - ALLYSON, C3, C4, cryoglobulins, ANCA ESR, CRP, ACE levels.    -- If her Blood work comes back normal then I recommend to get MRI of the left knee as recommended by the orthopedics. She can try physical therapy and anti-inflammatory for pain relief.     -- RTC in 2 months            Active Problem List:     Patient Active Problem List    Diagnosis Date Noted     Other neutropenia (H) 11/09/2017     Priority: Medium     Acute pain of left knee 11/09/2017     Priority: Medium     Family history of coronary artery disease 01/19/2012     Priority: Medium     Herpes simplex vulvovaginitis 01/19/2012     Priority: Low     IMO update changed this record. Please review for accuracy       CARDIOVASCULAR SCREENING; LDL GOAL LESS THAN 160 01/19/2012     Priority: Low     Postmenopausal bleeding 01/19/2012     Priority: Low     Ganglion cyst 01/19/2012     Priority: Low     Overweight (BMI 25.0-29.9) 01/19/2012     Priority: Low            History of Present Illness:   Libra Pena is a 52 year old female with PMH of leukopenia, random skin bruising seen in the clinic in consultation at request of Yusef Chery MD for evaluation of possible underlying connective tissue disease.     She reports hx of random pains in her fingers followed by skin bruise which last for about a week. She has been getting these symptoms for the last 15 years which are random and happen once a month. Recently noted in her feet which made her concerned. It started with swellling and pain in the bottom of her foot and next morning woke up with a bruise on her foot.     She also reports sudden  onset of left knee pain and swelling in June of this year. She saw Dr. Chaudhry and got a knee x-ray which revealed moderate joint effusion. She was recommended to get MRI of her knee and possible intra-articular steroid injection but has not done these yet. Her knee has slowly improved but still has some swelling left. Had some autoimmune workup which revealed negative rheumatoid serologies, ALLYSON, normal inflammatory markers and Lyme serology.     She also has acid reflux issues in the last couple years.  She had one episode of heart burn which lasted for hours and was associated with chest and jaw pain. She was seen at the ED and had EKG, echocardiogram which was normal.     She also has chronic leukopenia for few years and seen by hematologist recently who thinks it can be related to underlying autoimmune process or its her low-normal. No bone marrow biopsy was recommended at this time.    She has seen Dr. Askew at [facility] in 2009 for joint pains, had autoimmune workup including Rheumatoid serologies, uric acid, CK, ESR, ALLYSON, anti-SCL 70 antibody, centromere antibody and hand x-ray which all were normal.     Denies history of psoriasis, ulcerative colitis or chron's disease. No h/o iritis, enthesitis, finger or toe swelling like dactylitis, plantar fascitis or heel pain. Denies any raynauds, malar rash, photosensitivity, recurrent mouth/genital ulcers, sicca symptoms, recurrent sinusitis/rhinitis, swallowing difficulty, hearing or visual changes recently. No h/o arterial/venous thrombosis in the past. Denies any tick bite, recent GI/ infection.             Review of Systems:   Review Of Systems  Constitutional: denies fever, chills, night sweats and weight loss.  Skin: No skin rash.  Eyes: No dryness or irritation in eyes. No episode of eye inflammation or redness.   Ears/Nose/Throat: no recurrent sinus infections.  Respiratory: No shortness of breath, dyspnea on exertion, cough, or hemoptysis  Cardiovascular: no  "chest pain or palpitations.  Gastrointestinal: no nausea, vomiting, abdominal pain.  Normal bowel movements.  Genitourinary: no dysuria, frequency  or hematuria.  Musculoskeletal: as in HPI  Neurologic: no numbness, tingling.  Psychiatric: no mood disorders.  Hematologic/Lymphatic/Immunologic: no history of easy bruising, petechia or purpura.  No abnormal bleeding.   Endocrine: no h/o thyroid disease or Diabetes.                  Past Medical History:     Past Medical History:   Diagnosis Date     Knee pain      Leukopenia      Past Surgical History:   Procedure Laterality Date     APPENDECTOMY              Social History:     Social History     Occupational History     Not on file.     Social History Main Topics     Smoking status: Never Smoker     Smokeless tobacco: Never Used     Alcohol use Yes      Comment: occassionally      Drug use: No     Sexual activity: Yes     Partners: Male     Birth control/ protection: Surgical            Family History:     Family History   Problem Relation Age of Onset     Hypertension Mother      and brother     CEREBROVASCULAR DISEASE Mother      Arthritis Father      C.A.D. Brother 50     CABG at age 50            Allergies:   No Known Allergies         Medications:     Current Outpatient Prescriptions   Medication Sig Dispense Refill     naproxen (NAPROSYN) 500 MG tablet Take 1 tablet (500 mg) by mouth 2 times daily as needed for moderate pain 30 tablet 1     valACYclovir (VALTREX) 500 MG tablet Take 1 tablet (500 mg) by mouth 2 times daily 6 tablet 3            Physical Exam:   Blood pressure 106/69, pulse 51, temperature 97.9  F (36.6  C), resp. rate 18, height 1.638 m (5' 4.5\"), weight 70.2 kg (154 lb 12.8 oz), last menstrual period 12/16/2011, SpO2 99 %, not currently breastfeeding.  Wt Readings from Last 4 Encounters:   11/09/17 70.2 kg (154 lb 12.8 oz)   06/20/17 70.8 kg (156 lb)   06/16/17 70.9 kg (156 lb 4.8 oz)   04/10/17 70.8 kg (156 lb)       Constitutional: " well-developed, appearing stated age; cooperative  Eyes: nl EOM, PERRLA, vision, conjunctiva, sclera  ENT: nl external ears, nose, hearing, lips, teeth, gums, throat  No mucous membrane lesions, normal saliva pool  Neck: no mass or thyroid enlargement  Resp: lungs clear to auscultation, nl to palpation  CV: RRR, no murmurs, rubs or gallops, no edema  GI: no ABD mass or tenderness, no HSM  : not tested  Lymph: no cervical, supraclavicular, inguinal or epitrochlear nodes    MS: All TMJ, neck, shoulder, elbow, wrist, MCP/PIP/DIP, spine, hip, knee, ankle, and foot MTP/IP joints were examined.   -- She has tiny nodules over few of PIP joints bilaterally. They're not tender and freely mobile. There is no calcinosis noted. No tophi  -- She has some mild to moderate left knee fusion, no warmth or tenderness noted.  -- No active synovitis or deformity present over other joints. Full ROM.  Normal  strength.   -- No dactylitis,  tenosynovitis, enthesopathy.    Skin: no nail pitting, alopecia, rash, nodules or lesions  Neuro: nl cranial nerves, strength, sensation, DTRs.   Psych: nl judgement, orientation, memory, affect.         Data:     Results for orders placed or performed in visit on 11/09/17   ESR   Result Value Ref Range    Sed Rate 10 0 - 30 mm/h   CRP inflammation   Result Value Ref Range    CRP Inflammation <2.9 0.0 - 8.0 mg/L       Recent Labs   Lab Test  06/16/17   1145  06/19/13   1235  01/19/12   0939   WBC   --   3.8*   --    RBC   --   4.38   --    HGB   --   12.9   --    HCT   --   37.9   --    MCV   --   87   --    RDW   --   13.5   --    PLT   --   170   --    ALBUMIN   --   4.3   --    CRP  3.1   --   1.0   BUN   --   12   --       No results for input(s): TSH, T4 in the last 11169 hours.  Hemoglobin   Date Value Ref Range Status   06/19/2013 12.9 11.7 - 15.7 g/dL Final     Urea Nitrogen   Date Value Ref Range Status   06/19/2013 12 5 - 24 mg/dL Final     Sed Rate   Date Value Ref Range Status    11/09/2017 10 0 - 30 mm/h Final   06/16/2017 13 0 - 30 mm/h Final     CRP Cardiac Risk   Date Value Ref Range Status   01/19/2012 1.0 mg/L Final     Comment:     Reference Values:   Low Risk:           <1.0 mg/L   Average Risk:       1.0-3.0 mg/L   High Risk:          >3.0 mg/L   Acute Inflammation: >8.0 mg/L     CRP Inflammation   Date Value Ref Range Status   11/09/2017 <2.9 0.0 - 8.0 mg/L Final   06/16/2017 3.1 0.0 - 8.0 mg/L Final     AST   Date Value Ref Range Status   06/19/2013 19 0 - 45 U/L Final     Albumin   Date Value Ref Range Status   06/19/2013 4.3 3.9 - 5.1 g/dL Final     Alkaline Phosphatase   Date Value Ref Range Status   06/19/2013 88 40 - 150 U/L Final     ALT   Date Value Ref Range Status   06/19/2013 20 0 - 50 U/L Final     Rheumatoid Factor   Date Value Ref Range Status   06/16/2017 <20 <20 IU/mL Final     Recent Labs   Lab Test  06/19/13   1235   WBC  3.8*   HGB  12.9   HCT  37.9   MCV  87   PLT  170   BUN  12   AST  19   ALT  20   ALKPHOS  88       Outside studies reviewed: Dr Askew notes reviewed    Reviewed Rheumatology lab flowsheet    Shantell Spann MD  [facility]   Pager - [phone]

## 2017-11-09 NOTE — PATIENT INSTRUCTIONS
-- Blood tests today for Connective tissue diseases and vasculitis    -- Will review your labs    -- If blood work comes back normal , you can get MRI of knee and possible PT    -- RTC in 2 months

## 2017-11-09 NOTE — LETTER
11/9/2017      RE: Libra Pena  [address] [facility] [address]     Dear Colleague,    Thank you for referring your patient, Libra Pena, to the [facility]. Please see a copy of my visit note below.    Rheumatology Clinic Visit     Libra Pena MRN# [id number]   YOB: 1965 Age: 52 year old     Date of Visit: November 9, 2017  Primary care provider: Carmelina Sims          Assessment and Plan:   Assessment     -- Left knee pain and effusion  -- Chronic leukopenia  -- Random skin bruising over her hands and feet  -- GERD  -- Nodules over PIP joints    Ms Pena is a 52-year-old female seen in clinic for evaluation of possible connective tissue disease. 4 months ago she developed acute right knee pain and swelling which is getting better now. No other joints are involved denies any morning stiffness. On physical exam she does have moderate effusion in the left knee. She has skin nodules over few PIP joints which looks like possible rheumatoid nodules but her Rheumatoid serologies recently checked are negative and she does not have symmetric polyarticular arthritis suggestive of rheumatoid arthritis either. These  lesions are not suggestive of tophi or Ector nodes.     She has chronic leukopenia, was seen by hematologist at [facility]. All her workup was normal including protein electrophoresis, HIV, hepatitis panel, vitamin B12, folate and CMP except for white count of 3.9. She was recommended to get Rheumatology evaluation for possible underlying connective tissue disease. They did not recommend to get a bone marrow biopsy.     She reports history of random swelling and bruising of her fingers for the last 15 years and now for the first time happened in her feet that made her concerned. I saw her picture and the lesion looked like bruise. Not sure why she gets these lesions but it is not a typical presentation for vasculitis or inflammatory  arthritis. I will get ANCA titers and cryoglobulins.     Physical exam is benign except for left knee effusion. She does not have Raynaud's, pleurisy, sicca symptoms, oral mucosal ulcers, fever, fatigue or morning stiffness. She has negative rheumatoid serologies, normal inflammatory markers and negative ALLYSON by VIDHI.     I will get ALLYSON by immunofluorescence which is more sensitive. For the left knee pain, MRI and physical therapy can be considered. In case knee pain does not improve with conservative measures like NSAIDs and physical therapy trial of prednisone can be done.     Plan    -- Blood tests - ALLYSON, C3, C4, cryoglobulins, ANCA ESR, CRP, ACE levels.    -- If her Blood work comes back normal then I recommend to get MRI of the left knee as recommended by the orthopedics. She can try physical therapy and anti-inflammatory for pain relief.     -- RTC in 2 months            Active Problem List:     Patient Active Problem List    Diagnosis Date Noted     Other neutropenia (H) 11/09/2017     Priority: Medium     Acute pain of left knee 11/09/2017     Priority: Medium     Family history of coronary artery disease 01/19/2012     Priority: Medium     Herpes simplex vulvovaginitis 01/19/2012     Priority: Low     IMO update changed this record. Please review for accuracy       CARDIOVASCULAR SCREENING; LDL GOAL LESS THAN 160 01/19/2012     Priority: Low     Postmenopausal bleeding 01/19/2012     Priority: Low     Ganglion cyst 01/19/2012     Priority: Low     Overweight (BMI 25.0-29.9) 01/19/2012     Priority: Low            History of Present Illness:   Libra Pena is a 52 year old female with PMH of leukopenia, random skin bruising seen in the clinic in consultation at request of Yusef Chery MD for evaluation of possible underlying connective tissue disease.     She reports hx of random pains in her fingers followed by skin bruise which last for about a week. She has been getting these symptoms for  the last 15 years which are random and happen once a month. Recently noted in her feet which made her concerned. It started with swellling and pain in the bottom of her foot and next morning woke up with a bruise on her foot.     She also reports sudden onset of left knee pain and swelling in June of this year. She saw Dr. Chaudhry and got a knee x-ray which revealed moderate joint effusion. She was recommended to get MRI of her knee and possible intra-articular steroid injection but has not done these yet. Her knee has slowly improved but still has some swelling left. Had some autoimmune workup which revealed negative rheumatoid serologies, ALLYSON, normal inflammatory markers and Lyme serology.     She also has acid reflux issues in the last couple years.  She had one episode of heart burn which lasted for hours and was associated with chest and jaw pain. She was seen at the ED and had EKG, echocardiogram which was normal.     She also has chronic leukopenia for few years and seen by hematologist recently who thinks it can be related to underlying autoimmune process or its her low-normal. No bone marrow biopsy was recommended at this time.    She has seen Dr. Askew at [facility] in 2009 for joint pains, had autoimmune workup including Rheumatoid serologies, uric acid, CK, ESR, ALLYSON, anti-SCL 70 antibody, centromere antibody and hand x-ray which all were normal.     Denies history of psoriasis, ulcerative colitis or chron's disease. No h/o iritis, enthesitis, finger or toe swelling like dactylitis, plantar fascitis or heel pain. Denies any raynauds, malar rash, photosensitivity, recurrent mouth/genital ulcers, sicca symptoms, recurrent sinusitis/rhinitis, swallowing difficulty, hearing or visual changes recently. No h/o arterial/venous thrombosis in the past. Denies any tick bite, recent GI/ infection.             Review of Systems:   Review Of Systems  Constitutional: denies fever, chills, night sweats and weight  "loss.  Skin: No skin rash.  Eyes: No dryness or irritation in eyes. No episode of eye inflammation or redness.   Ears/Nose/Throat: no recurrent sinus infections.  Respiratory: No shortness of breath, dyspnea on exertion, cough, or hemoptysis  Cardiovascular: no chest pain or palpitations.  Gastrointestinal: no nausea, vomiting, abdominal pain.  Normal bowel movements.  Genitourinary: no dysuria, frequency  or hematuria.  Musculoskeletal: as in HPI  Neurologic: no numbness, tingling.  Psychiatric: no mood disorders.  Hematologic/Lymphatic/Immunologic: no history of easy bruising, petechia or purpura.  No abnormal bleeding.   Endocrine: no h/o thyroid disease or Diabetes.                  Past Medical History:     Past Medical History:   Diagnosis Date     Knee pain      Leukopenia      Past Surgical History:   Procedure Laterality Date     APPENDECTOMY              Social History:     Social History     Occupational History     Not on file.     Social History Main Topics     Smoking status: Never Smoker     Smokeless tobacco: Never Used     Alcohol use Yes      Comment: occassionally      Drug use: No     Sexual activity: Yes     Partners: Male     Birth control/ protection: Surgical            Family History:     Family History   Problem Relation Age of Onset     Hypertension Mother      and brother     CEREBROVASCULAR DISEASE Mother      Arthritis Father      C.A.D. Brother 50     CABG at age 50            Allergies:   No Known Allergies         Medications:     Current Outpatient Prescriptions   Medication Sig Dispense Refill     naproxen (NAPROSYN) 500 MG tablet Take 1 tablet (500 mg) by mouth 2 times daily as needed for moderate pain 30 tablet 1     valACYclovir (VALTREX) 500 MG tablet Take 1 tablet (500 mg) by mouth 2 times daily 6 tablet 3            Physical Exam:   Blood pressure 106/69, pulse 51, temperature 97.9  F (36.6  C), resp. rate 18, height 1.638 m (5' 4.5\"), weight 70.2 kg (154 lb 12.8 oz), last " menstrual period 12/16/2011, SpO2 99 %, not currently breastfeeding.  Wt Readings from Last 4 Encounters:   11/09/17 70.2 kg (154 lb 12.8 oz)   06/20/17 70.8 kg (156 lb)   06/16/17 70.9 kg (156 lb 4.8 oz)   04/10/17 70.8 kg (156 lb)       Constitutional: well-developed, appearing stated age; cooperative  Eyes: nl EOM, PERRLA, vision, conjunctiva, sclera  ENT: nl external ears, nose, hearing, lips, teeth, gums, throat  No mucous membrane lesions, normal saliva pool  Neck: no mass or thyroid enlargement  Resp: lungs clear to auscultation, nl to palpation  CV: RRR, no murmurs, rubs or gallops, no edema  GI: no ABD mass or tenderness, no HSM  : not tested  Lymph: no cervical, supraclavicular, inguinal or epitrochlear nodes    MS: All TMJ, neck, shoulder, elbow, wrist, MCP/PIP/DIP, spine, hip, knee, ankle, and foot MTP/IP joints were examined.   -- She has tiny nodules over few of PIP joints bilaterally. They're not tender and freely mobile. There is no calcinosis noted. No tophi  -- She has some mild to moderate left knee fusion, no warmth or tenderness noted.  -- No active synovitis or deformity present over other joints. Full ROM.  Normal  strength.   -- No dactylitis,  tenosynovitis, enthesopathy.    Skin: no nail pitting, alopecia, rash, nodules or lesions  Neuro: nl cranial nerves, strength, sensation, DTRs.   Psych: nl judgement, orientation, memory, affect.         Data:     Results for orders placed or performed in visit on 11/09/17   ESR   Result Value Ref Range    Sed Rate 10 0 - 30 mm/h   CRP inflammation   Result Value Ref Range    CRP Inflammation <2.9 0.0 - 8.0 mg/L       Recent Labs   Lab Test  06/16/17   1145  06/19/13   1235  01/19/12   0939   WBC   --   3.8*   --    RBC   --   4.38   --    HGB   --   12.9   --    HCT   --   37.9   --    MCV   --   87   --    RDW   --   13.5   --    PLT   --   170   --    ALBUMIN   --   4.3   --    CRP  3.1   --   1.0   BUN   --   12   --       No results for  input(s): TSH, T4 in the last 01810 hours.  Hemoglobin   Date Value Ref Range Status   06/19/2013 12.9 11.7 - 15.7 g/dL Final     Urea Nitrogen   Date Value Ref Range Status   06/19/2013 12 5 - 24 mg/dL Final     Sed Rate   Date Value Ref Range Status   11/09/2017 10 0 - 30 mm/h Final   06/16/2017 13 0 - 30 mm/h Final     CRP Cardiac Risk   Date Value Ref Range Status   01/19/2012 1.0 mg/L Final     Comment:     Reference Values:   Low Risk:           <1.0 mg/L   Average Risk:       1.0-3.0 mg/L   High Risk:          >3.0 mg/L   Acute Inflammation: >8.0 mg/L     CRP Inflammation   Date Value Ref Range Status   11/09/2017 <2.9 0.0 - 8.0 mg/L Final   06/16/2017 3.1 0.0 - 8.0 mg/L Final     AST   Date Value Ref Range Status   06/19/2013 19 0 - 45 U/L Final     Albumin   Date Value Ref Range Status   06/19/2013 4.3 3.9 - 5.1 g/dL Final     Alkaline Phosphatase   Date Value Ref Range Status   06/19/2013 88 40 - 150 U/L Final     ALT   Date Value Ref Range Status   06/19/2013 20 0 - 50 U/L Final     Rheumatoid Factor   Date Value Ref Range Status   06/16/2017 <20 <20 IU/mL Final     Recent Labs   Lab Test  06/19/13   1235   WBC  3.8*   HGB  12.9   HCT  37.9   MCV  87   PLT  170   BUN  12   AST  19   ALT  20   ALKPHOS  88       Outside studies reviewed: Dr Askew notes reviewed    Reviewed Rheumatology lab flowsheet    Shantell Spann MD  [facility]   Pager - [phone]      Again, thank you for allowing me to participate in the care of your patient.      Sincerely,    Shantell Spann MD

## 2017-11-10 LAB
ANA SER QL IF: NEGATIVE
C3 SERPL-MCNC: 105 MG/DL (ref 76–169)
C4 SERPL-MCNC: 32 MG/DL (ref 15–50)

## 2017-11-11 LAB
ACE SERPL-CCNC: 32 U/L (ref 9–67)
ANCA IGG TITR SER IF: NORMAL {TITER}

## 2017-11-15 LAB — CRYOGLOB SER QL: ABNORMAL %

## 2017-11-17 ENCOUNTER — MYC MEDICAL ADVICE (OUTPATIENT)
Dept: RHEUMATOLOGY | Facility: CLINIC | Age: 52
End: 2017-11-17

## 2017-11-20 NOTE — TELEPHONE ENCOUNTER
Dr. Spann will need to interpret the result when she returns. This is a trace positive result and does not require immediate action.     Cryoglobulin is a blood protein that is unstable in the cold. Sometimes this can reflect an abnormal immune response. This result will need to be repeated in the future, and may need to be evaluated further, either by Dr. Spann or by the patient's hematologist.

## 2018-01-12 ENCOUNTER — OFFICE VISIT (OUTPATIENT)
Dept: RHEUMATOLOGY | Facility: CLINIC | Age: 53
End: 2018-01-12
Payer: COMMERCIAL

## 2018-01-12 VITALS
WEIGHT: 154 LBS | SYSTOLIC BLOOD PRESSURE: 106 MMHG | HEIGHT: 64 IN | OXYGEN SATURATION: 99 % | BODY MASS INDEX: 26.29 KG/M2 | DIASTOLIC BLOOD PRESSURE: 70 MMHG | HEART RATE: 56 BPM

## 2018-01-12 DIAGNOSIS — M25.462 KNEE EFFUSION, LEFT: Primary | ICD-10-CM

## 2018-01-12 PROCEDURE — 99214 OFFICE O/P EST MOD 30 MIN: CPT | Performed by: STUDENT IN AN ORGANIZED HEALTH CARE EDUCATION/TRAINING PROGRAM

## 2018-01-12 ASSESSMENT — PAIN SCALES - GENERAL: PAINLEVEL: NO PAIN (0)

## 2018-01-12 NOTE — PATIENT INSTRUCTIONS
-- Right knee is improving     -- At this point all your blood tests are normal. Lupus is unlikely    -- Seronegative RA or spondyloarthritis is still a possibility but in the absence of other joint involvement and no systemic symptoms I do not recommend to start DMARD treatment like Methotrexate    -- Will observe     -- RTC on as needed basis

## 2018-01-12 NOTE — MR AVS SNAPSHOT
After Visit Summary   1/12/2018    Libra Pena    MRN: [id number]           Patient Information     Date Of Birth          1965        Visit Information        Provider Department      1/12/2018 11:30 AM Shantell Spann MD [facility]        Today's Diagnoses     Knee effusion, left    -  1      Care Instructions    -- Right knee is improving     -- At this point all your blood tests are normal. Lupus is unlikely    -- Seronegative RA or spondyloarthritis is still a possibility but in the absence of other joint involvement and no systemic symptoms I do not recommend to start DMARD treatment like Methotrexate    -- Will observe     -- RTC on as needed basis           Follow-ups after your visit        Follow-up notes from your care team     Return if symptoms worsen or fail to improve.      Who to contact     If you have questions or need follow up information about today's clinic visit or your schedule please contact [facility] directly at [phone].  Normal or non-critical lab and imaging results will be communicated to you by MyChart, letter or phone within 4 business days after the clinic has received the results. If you do not hear from us within 7 days, please contact the clinic through [email or web address] or phone. If you have a critical or abnormal lab result, we will notify you by phone as soon as possible.  Submit refill requests through the Shelf or call your pharmacy and they will forward the refill request to us. Please allow 3 business days for your refill to be completed.          Additional Information About Your Visit        [email or web address] Information     the Shelf gives you secure access to your electronic health record. If you see a primary care provider, you can also send messages to your care team and make appointments. If you have questions, please call your primary care clinic.  If you do not have a primary care provider, please call [phone]  "and they will assist you.      [employer] is an electronic gateway that provides easy, online access to your medical records. With [employer], you can request a clinic appointment, read your test results, renew a prescription or communicate with your care team.     To access your existing account, please contact your [facility] or call [phone] for assistance.        Care EveryWhere ID     This is your Care EveryWhere ID. This could be used by other organizations to access your [place] medical records  [id number]        Your Vitals Were     Pulse Height Last Period Pulse Oximetry BMI (Body Mass Index)       56 1.626 m (5' 4\") 12/16/2011 99% 26.43 kg/m2        Blood Pressure from Last 3 Encounters:   01/12/18 106/70   11/09/17 106/69   06/20/17 115/66    Weight from Last 3 Encounters:   01/12/18 69.9 kg (154 lb)   11/09/17 70.2 kg (154 lb 12.8 oz)   06/20/17 70.8 kg (156 lb)              Today, you had the following     No orders found for display       Primary Care Provider Office Phone # Fax #    Carmelina Sims MD PhD [phone]       [address]  [facility] [address]        Equal Access to Services     JESSICA CARTER : Hadii aad ku hadasho Soomaali, waaxda luqadaha, qaybta kaalmada adeegyada, waxay idiin haylaken [place] kharadonna la'laken . So [facility] [phone].    ATENCIÓN: Si habla español, tiene a torres disposición servicios gratuitos de asistencia lingüística. Llame al [phone].    We comply with applicable federal civil rights laws and [place] laws. We do not discriminate on the basis of race, color, national origin, age, disability, sex, sexual orientation, or gender identity.            Thank you!     Thank you for choosing [facility]  for your care. Our goal is always to provide you with excellent care. Hearing back from our patients is one way we can continue to improve our services. Please take a few minutes to complete the written survey that " you may receive in the mail after your visit with us. Thank you!             Your Updated Medication List - Protect others around you: Learn how to safely use, store and throw away your medicines at www.disposemymeds.org.          This list is accurate as of: 1/12/18 12:37 PM.  Always use your most recent med list.                   Brand Name Dispense Instructions for use Diagnosis    naproxen 500 MG tablet    NAPROSYN    30 tablet    Take 1 tablet (500 mg) by mouth 2 times daily as needed for moderate pain    Left knee pain, unspecified chronicity       valACYclovir 500 MG tablet    VALTREX    6 tablet    Take 1 tablet (500 mg) by mouth 2 times daily    Herpes simplex vulvovaginitis

## 2018-01-12 NOTE — PROGRESS NOTES
Rheumatology Clinic Visit     Libra Pena MRN# [id number]   YOB: 1965 Age: 52 year old     Date of Visit: January 12, 2018    Primary care provider: Carmelina Sims          Assessment and Plan:   Assessment     -- Left knee pain and effusion - improved   -- Chronic leukopenia  -- Random skin bruising over her hands and feet   -- GERD  -- Nodules over PIP joints    Ms Pena is 52-year-old female seen in clinic for follow up evaluation of possible connective tissue disease. 6 months ago she developed acute right knee pain and swelling which is getting better now. No other joints are involved, denies any morning stiffness. On physical exam she does has mild effusion in the left knee. She has skin nodules over few PIP joints which looks like possible rheumatoid nodules but her Rheumatoid serologies recently checked are negative and she does not have symmetric polyarticular arthritis suggestive of rheumatoid arthritis either. These  lesions are not suggestive of tophi or Ector nodes.     Since her left knee effusion is getting better, I do not recommend to start her on any DMARD therapy especially since all her autoimmune work up is negative. Seronegative RA or undifferentiated arthritis is still a possibility but in the absence of other joint inflammation I recommend to observe and see if the knee pain and effusion improves.  In case if she develops new psoriasis or joint pains involving other joints then will reevaluate her for possibility of undifferentiated spondylarthritis and consider treatment with DMARD's like methotrexate or sulfasalazine.    She has chronic leukopenia, was seen by hematologist at [facility] [place]. All her workup was normal including protein electrophoresis, HIV, hepatitis panel, vitamin B12, folate and CMP except for white count of 3.9. She was recommended to get Rheumatology evaluation for possible underlying connective tissue disease. They did not recommend to get a bone  marrow biopsy.     She reports history of random swelling and bruising of her fingers for the last 15 years and now for the first time happened in her feet that made her concerned. I saw her picture and the lesion looked like bruise. Not sure why she gets these lesions but it is not a typical presentation for vasculitis or inflammatory arthritis. She has negative ANCA titers and trace cryoglobulins.     She does not have Raynaud's, pleurisy, sicca symptoms, oral mucosal ulcers, fever, fatigue or morning stiffness. She has negative rheumatoid serologies, normal inflammatory markers and negative ALLYSON by VIDHI and IF.  Lupus is unlikely.       Plan    -- Right knee is improving     -- At this point all blood tests are normal. Lupus is unlikely    -- Seronegative RA or spondyloarthritis is still a possibility but in the absence of other joint involvement and no systemic symptoms I do not recommend to start DMARD treatment like Methotrexate    -- Will observe     -- RTC on as needed basis             Active Problem List:     Patient Active Problem List    Diagnosis Date Noted     Knee effusion, left 01/12/2018     Priority: Medium     Other neutropenia (H) 11/09/2017     Priority: Medium     Acute pain of left knee 11/09/2017     Priority: Medium     Family history of coronary artery disease 01/19/2012     Priority: Medium     Herpes simplex vulvovaginitis 01/19/2012     Priority: Low     IMO update changed this record. Please review for accuracy       CARDIOVASCULAR SCREENING; LDL GOAL LESS THAN 160 01/19/2012     Priority: Low     Postmenopausal bleeding 01/19/2012     Priority: Low     Ganglion cyst 01/19/2012     Priority: Low     Overweight (BMI 25.0-29.9) 01/19/2012     Priority: Low            History of Present Illness:   Libra Pena is a 52 year old female with PMH of leukopenia, random skin bruising seen in the clinic for follow up evaluation of possible underlying connective tissue disease.      Interim History :January 12, 2018 -  Her left knee effusion and pain is improving. She has not noticed any pain or effusion in other joints.  Denies any morning stiffness.  Blood tests done on the last visit were unremarkable except for trace cryoglobulins.      HPI from initial visit : She was seen in consultation at request of Yusef Chery MD. She reports hx of random pains in her fingers followed by skin bruise which last for about a week. She has been getting these symptoms for the last 15 years which are random and happen once a month. Recently noted in her feet which made her concerned. It started with swellling and pain in the bottom of her foot and next morning woke up with a bruise on her foot.     She also reports sudden onset of left knee pain and swelling in June of this year. She saw Dr. Chaudhry and got a knee x-ray which revealed moderate joint effusion. She was recommended to get MRI of her knee and possible intra-articular steroid injection but has not done these yet. Her knee has slowly improved but still has some swelling left. Had some autoimmune workup which revealed negative rheumatoid serologies, ALLYSON, normal inflammatory markers and Lyme serology.     She also has acid reflux issues in the last couple years.  She had one episode of heart burn which lasted for hours and was associated with chest and jaw pain. She was seen at the ED and had EKG, echocardiogram which was normal.     She also has chronic leukopenia for few years and seen by hematologist recently who thinks it can be related to underlying autoimmune process or its her low-normal. No bone marrow biopsy was recommended at this time.    She has seen Dr. Askew at [facility] in 2009 for joint pains, had autoimmune workup including Rheumatoid serologies, uric acid, CK, ESR, ALLYSON, anti-SCL 70 antibody, centromere antibody and hand x-ray which all were normal.     Denies history of psoriasis, ulcerative colitis or chron's disease. No h/o  iritis, enthesitis, finger or toe swelling like dactylitis, plantar fascitis or heel pain. Denies any raynauds, malar rash, photosensitivity, recurrent mouth/genital ulcers, sicca symptoms, recurrent sinusitis/rhinitis, swallowing difficulty, hearing or visual changes recently. No h/o arterial/venous thrombosis in the past. Denies any tick bite, recent GI/ infection.             Review of Systems:   Review Of Systems  Constitutional: denies fever, chills, night sweats and weight loss.  Skin: She has noted tiny sub centimeter erythematous dry skin patch over her right lower leg.   Eyes: No dryness or irritation in eyes. No episode of eye inflammation or redness.   Ears/Nose/Throat: no recurrent sinus infections.  Respiratory: No shortness of breath, dyspnea on exertion, cough, or hemoptysis  Cardiovascular: no chest pain or palpitations.  Gastrointestinal: no nausea, vomiting, abdominal pain.  Normal bowel movements.  Genitourinary: no dysuria, frequency  or hematuria.  Musculoskeletal: as in HPI  Neurologic: no numbness, tingling.  Psychiatric: no mood disorders.  Hematologic/Lymphatic/Immunologic: no history of easy bruising, petechia or purpura.  No abnormal bleeding.   Endocrine: no h/o thyroid disease or Diabetes.                  Past Medical History:     Past Medical History:   Diagnosis Date     Knee pain      Leukopenia      Past Surgical History:   Procedure Laterality Date     APPENDECTOMY              Social History:     Social History     Occupational History     Not on file.     Social History Main Topics     Smoking status: Never Smoker     Smokeless tobacco: Never Used     Alcohol use Yes      Comment: occassionally      Drug use: No     Sexual activity: Yes     Partners: Male     Birth control/ protection: Surgical            Family History:     Family History   Problem Relation Age of Onset     Hypertension Mother      and brother     CEREBROVASCULAR DISEASE Mother      Arthritis Father       "DERIAN Brother 50     CABG at age 50            Allergies:   No Known Allergies         Medications:     Current Outpatient Prescriptions   Medication Sig Dispense Refill     naproxen (NAPROSYN) 500 MG tablet Take 1 tablet (500 mg) by mouth 2 times daily as needed for moderate pain 30 tablet 1     valACYclovir (VALTREX) 500 MG tablet Take 1 tablet (500 mg) by mouth 2 times daily 6 tablet 3            Physical Exam:   Blood pressure 106/70, pulse 56, height 1.626 m (5' 4\"), weight 69.9 kg (154 lb), last menstrual period 12/16/2011, SpO2 99 %, not currently breastfeeding.  Wt Readings from Last 4 Encounters:   01/12/18 69.9 kg (154 lb)   11/09/17 70.2 kg (154 lb 12.8 oz)   06/20/17 70.8 kg (156 lb)   06/16/17 70.9 kg (156 lb 4.8 oz)       Constitutional: well-developed, appearing stated age; cooperative  Eyes: nl EOM, PERRLA, vision, conjunctiva, sclera  ENT: nl external ears, nose, hearing, lips, teeth, gums, throat  No mucous membrane lesions, normal saliva pool  Neck: no mass or thyroid enlargement  Resp: lungs clear to auscultation, nl to palpation  CV: RRR, no murmurs, rubs or gallops, no edema  GI: no ABD mass or tenderness, no HSM  : not tested  Lymph: no cervical, supraclavicular, inguinal or epitrochlear nodes    MS: All TMJ, neck, shoulder, elbow, wrist, MCP/PIP/DIP, spine, hip, knee, ankle, and foot MTP/IP joints were examined.   -- She has tiny nodules over few of PIP joints bilaterally. They're not tender and freely mobile. There is no calcinosis noted. No tophi  -- She has some mild left knee fusion, no warmth or tenderness noted. Improved as compared to before.   -- No active synovitis or deformity present over other joints. Full ROM.  Normal  strength.   -- No dactylitis,  tenosynovitis, enthesopathy.    Skin: no nail pitting, alopecia, rash, nodules or lesions. She has noted tiny sub centimeter erythematous dry skin patch over her right lower leg which looks like psoriasis vs eczema.     Neuro: " nl cranial nerves, strength, sensation, DTRs.   Psych: nl judgement, orientation, memory, affect.         Data:     Results for orders placed or performed in visit on 11/09/17   Anti Nuclear Ju IgG by IFA with Reflex   Result Value Ref Range    ALLYSON interpretation Negative NEG^Negative   Complement C3   Result Value Ref Range    Complement C3 105 76 - 169 mg/dL   Complement C4   Result Value Ref Range    Complement C4 32 15 - 50 mg/dL   ESR   Result Value Ref Range    Sed Rate 10 0 - 30 mm/h   CRP inflammation   Result Value Ref Range    CRP Inflammation <2.9 0.0 - 8.0 mg/L   Antineutrophil cytoplasmic Ju IgG   Result Value Ref Range    Neutrophil Cytoplasmic IgG Antibody <1:20 <1:20   Cryoglobulin quantitative   Result Value Ref Range    Cryoglobulin Trace (A) NEG^Negative %   Angiotensin converting enzyme   Result Value Ref Range    Angiotensin Converting Enzyme 32 9 - 67 U/L       Recent Labs   Lab Test  06/16/17   1145  06/19/13   1235  01/19/12   0939   WBC   --   3.8*   --    RBC   --   4.38   --    HGB   --   12.9   --    HCT   --   37.9   --    MCV   --   87   --    RDW   --   13.5   --    PLT   --   170   --    ALBUMIN   --   4.3   --    CRP  3.1   --   1.0   BUN   --   12   --       No results for input(s): TSH, T4 in the last 10097 hours.  Hemoglobin   Date Value Ref Range Status   06/19/2013 12.9 11.7 - 15.7 g/dL Final     Urea Nitrogen   Date Value Ref Range Status   06/19/2013 12 5 - 24 mg/dL Final     Sed Rate   Date Value Ref Range Status   11/09/2017 10 0 - 30 mm/h Final   06/16/2017 13 0 - 30 mm/h Final     CRP Cardiac Risk   Date Value Ref Range Status   01/19/2012 1.0 mg/L Final     Comment:     Reference Values:   Low Risk:           <1.0 mg/L   Average Risk:       1.0-3.0 mg/L   High Risk:          >3.0 mg/L   Acute Inflammation: >8.0 mg/L     CRP Inflammation   Date Value Ref Range Status   11/09/2017 <2.9 0.0 - 8.0 mg/L Final   06/16/2017 3.1 0.0 - 8.0 mg/L Final     AST   Date Value Ref  Range Status   06/19/2013 19 0 - 45 U/L Final     Neutrophil Cytoplasmic IgG Antibody   Date Value Ref Range Status   11/09/2017 <1:20 <1:20 Final     Comment:     (Note)  The ANCA IFA is <1:20; therefore, no further testing will   be performed.  INTERPRETIVE INFORMATION: Anti-Neutrophil Cyto Ab, IgG  Neutrophil Cytoplasmic Antibodies (C-ANCA = granular   cytoplasmic staining, P-ANCA = perinuclear staining) are   found in the serum of over 90 percent of patients with   certain necrotizing systemic vasculitides, and usually in   less than 5 percent of patients with collagen vascular   disease or arthritis.  Performed by [facility],  [address] [place] [id number] [phone]  [email or web address], Pieter Esposito MD, Lab. Director       Albumin   Date Value Ref Range Status   06/19/2013 4.3 3.9 - 5.1 g/dL Final     Alkaline Phosphatase   Date Value Ref Range Status   06/19/2013 88 40 - 150 U/L Final     ALT   Date Value Ref Range Status   06/19/2013 20 0 - 50 U/L Final     Rheumatoid Factor   Date Value Ref Range Status   06/16/2017 <20 <20 IU/mL Final     Recent Labs   Lab Test  06/19/13   1235   WBC  3.8*   HGB  12.9   HCT  37.9   MCV  87   PLT  170   BUN  12   AST  19   ALT  20   ALKPHOS  88       Outside studies reviewed: Dr Askew notes reviewed    Reviewed Rheumatology lab flowsheet    Shantell Spann MD  [facility]   Pager - [phone]

## 2018-02-14 DIAGNOSIS — Z12.11 SPECIAL SCREENING FOR MALIGNANT NEOPLASMS, COLON: ICD-10-CM

## 2018-02-14 PROCEDURE — 82274 ASSAY TEST FOR BLOOD FECAL: CPT | Performed by: INTERNAL MEDICINE

## 2018-02-16 ENCOUNTER — TELEPHONE (OUTPATIENT)
Dept: PEDIATRICS | Facility: CLINIC | Age: 53
End: 2018-02-16

## 2018-02-16 DIAGNOSIS — Z12.11 SPECIAL SCREENING FOR MALIGNANT NEOPLASMS, COLON: Primary | ICD-10-CM

## 2018-02-16 LAB — HEMOCCULT STL QL IA: NEGATIVE

## 2018-02-16 NOTE — TELEPHONE ENCOUNTER
Afsaneh from the  [facility] called and states patient dropped off a FIT test and they need orders placed.    They thought maybe it was endocrine that ordered the FIT test but lab called Endo and they did not order it.      Please call Afsaneh in lab at [phone] when the FIT test orders are in.    Message routed to Dr. Nader Rosario CMA

## 2018-02-16 NOTE — PROGRESS NOTES
Dear Libra,   Here are your recent results which are within the expected range.  Please continue with your current plan of care.     Please call or Mychart to our office if you have further questions.     Carmelina Sims MD-PhD

## 2019-05-20 ENCOUNTER — MYC REFILL (OUTPATIENT)
Dept: PEDIATRICS | Facility: CLINIC | Age: 54
End: 2019-05-20

## 2019-05-20 DIAGNOSIS — A60.04 HERPES SIMPLEX VULVOVAGINITIS: ICD-10-CM

## 2019-05-20 RX ORDER — VALACYCLOVIR HYDROCHLORIDE 500 MG/1
500 TABLET, FILM COATED ORAL 2 TIMES DAILY
Qty: 6 TABLET | Refills: 0 | Status: SHIPPED | OUTPATIENT
Start: 2019-05-20 | End: 2023-06-29

## 2019-05-30 ENCOUNTER — OFFICE VISIT (OUTPATIENT)
Dept: PEDIATRICS | Facility: CLINIC | Age: 54
End: 2019-05-30
Payer: COMMERCIAL

## 2019-05-30 VITALS
OXYGEN SATURATION: 97 % | TEMPERATURE: 98.9 F | WEIGHT: 154.7 LBS | BODY MASS INDEX: 25.77 KG/M2 | HEART RATE: 75 BPM | DIASTOLIC BLOOD PRESSURE: 70 MMHG | HEIGHT: 65 IN | SYSTOLIC BLOOD PRESSURE: 105 MMHG

## 2019-05-30 DIAGNOSIS — N81.4 UTERINE PROLAPSE: ICD-10-CM

## 2019-05-30 DIAGNOSIS — N76.0 BACTERIAL VAGINOSIS: ICD-10-CM

## 2019-05-30 DIAGNOSIS — N76.0 ACUTE VAGINITIS: Primary | ICD-10-CM

## 2019-05-30 DIAGNOSIS — B96.89 BACTERIAL VAGINOSIS: ICD-10-CM

## 2019-05-30 LAB
SPECIMEN SOURCE: ABNORMAL
WET PREP SPEC: ABNORMAL

## 2019-05-30 PROCEDURE — 99213 OFFICE O/P EST LOW 20 MIN: CPT | Performed by: NURSE PRACTITIONER

## 2019-05-30 PROCEDURE — 87210 SMEAR WET MOUNT SALINE/INK: CPT | Performed by: NURSE PRACTITIONER

## 2019-05-30 RX ORDER — METRONIDAZOLE 500 MG/1
500 TABLET ORAL 2 TIMES DAILY
Qty: 14 TABLET | Refills: 0 | Status: SHIPPED | OUTPATIENT
Start: 2019-05-30 | End: 2019-06-06

## 2019-05-30 ASSESSMENT — MIFFLIN-ST. JEOR: SCORE: 1294.65

## 2019-05-30 NOTE — PATIENT INSTRUCTIONS
PLAN:   1.   Orders Placed This Encounter   Procedures     OB/GYN REFERRAL     2. Patient needs to follow up in if no improvement,or sooner if worsening of symptoms or other symptoms develop.  CONSULTATION/REFERRAL to Gynecology  Will follow up and/or notify patient of  results via My Chart to determine further need for followup      Patient Education     Pelvic Organ Prolapse  Pelvic organ prolapse is when 1 or more organs inside the pelvis slip from their normal places. The pelvis is found between the waist and thighs. Normally, muscles and tissues in the pelvic region support the pelvic organs and hold them in place.  What is a normal pelvis?     Cutaway view of pelvis showing the small intestine, bladder, pubic bone, urethra, pelvic floor muscles, uterus, vagina, and rectum.   A. The small intestine absorbs nutrients from food.  B. The bladder collects and holds urine.  C. The pubic bone helps protect the pelvic organs.  D. The urethra is the tube that carries urine out of the body.  E. The pelvic floor muscles support organs and other structures in the pelvis.  F. The uterus is where the baby develops when a women is pregnant.  G. The vagina is the canal from the uterus to the outside of the body.  H. The rectum stores stool until a bowel movement occurs.  What causes pelvic organ prolapse?   There are several causes of pelvic organ prolapse including:    Vaginal childbirth    Hereditary (genetic) factors    Connective tissue disorders    Getting older    Constant coughing (such as with bronchitis or smoking)    Heavy lifting    Chronic straining (such as with constipation)    Being overweight  What are the symptoms of pelvic organ prolapse?  The symptoms of pelvic organ prolapse include:    A feeling of fullness or pressure in your pelvis    A sense that a ball or lump is sticking out from the vagina    Problems passing urine or having a bowel movement    Urine leakage when you cough or use stairs. (But this  can happen even without prolapse.)     Pain or pressure in your low back    Pain when having sex  Date Last Reviewed: 6/1/2017 2000-2018 [employer]. [address] [place] [address], [place] [address]. All rights reserved. This information is not intended as a substitute for professional medical care. Always follow your healthcare professional's instructions.           Patient Education     Atrophic Vaginitis    Atrophic vaginitis means the walls of your vagina have become thin. This happens when your body makes too little of the hormone estrogen. Menopause or surgical removal of the ovaries are the most common causes for a drop in estrogen. Breastfeeding can also cause the hormone level to drop.  Symptoms of atrophic vaginitis include:    Dryness, soreness, burning, or itching in the vagina    Vaginal discharge  Sex can be uncomfortable, even painful. After sex, you may have bleeding from your vagina. You may also have burning or pain when you urinate.  Home care  Your healthcare provider may recommend 1 or more of these as treatment:    Vaginal creams, lotions, and lubricants. These products help relieve vaginal dryness. They don t need a prescription. They can be found in the personal care section of most pharmacies. Creams and lotions are used daily to help keep the vagina moist. Lubricants help reduce dryness and pain during sex. Choose water-based lubricants. Don t use petroleum jelly, mineral oil, or other oils. These increase the chance of infection.     Hormone therapy (HT). HT increases the amount of estrogen in your body. This can help manage or relieve symptoms. HT can be given in pill form. It may be given as a lotion, cream, ring put into the vagina, or a patch on the skin. The risks and benefits of HT vary for each woman. For instance, your risk may be higher if you have had breast cancer. Discuss this treatment with your healthcare provider. Not every woman can use HT.  You don t need to give  up (abstain from) sex. In fact, regular sex can help keep vaginal tissues healthy. Take steps to make sex more comfortable by using water-based lubricants.  Preventing infections  Atrophic vaginitis makes an infection of the vagina or the urinary tract more likely. To help reduce your risk:    Keep your genitals clean. When you bathe, wash the outside of your vagina with mild soap and water. Clean gently between the folds of your vagina.    Wipe from front to back after a bowel movement.    Don t douche unless your healthcare provider tells you to.    Avoid scented toilet paper, scented vaginal sprays, and scented tampons.    Avoid wearing clothes that are tight in the crotch. These include pantyhose, jeans, and leggings. Wear cotton underwear. Change it every day.  Follow-up care  Follow up with your healthcare provider, or as advised.  When to seek medical advice  Call your health care provider right away if any of these occur:    Fever of 100.4 F (38 C) or higher, or as directed by your healthcare provider    Symptoms don t go away or get worse even with treatment    Vaginal area swells or becomes painful    Vaginal area bleeds, but not because of your period    Bad-smelling discharge from the vagina    Pain or burning when you urinate or you have trouble passing urine    Open sores develop around vagina   Date Last Reviewed: 12/1/2016 2000-2018 [employer]. [address] [place] [address], [place] [address]. All rights reserved. This information is not intended as a substitute for professional medical care. Always follow your healthcare professional's instructions.

## 2019-05-30 NOTE — NURSING NOTE
"Chief Complaint   Patient presents with     Vaginal Problem     vaginal irritation x 2 weeks       Initial /70 (BP Location: Right arm, Patient Position: Sitting, Cuff Size: Adult Regular)   Pulse 75   Temp 98.9  F (37.2  C) (Temporal)   Ht 1.638 m (5' 4.5\")   Wt 70.2 kg (154 lb 11.2 oz)   LMP 12/16/2011   SpO2 97%   Breastfeeding? No   BMI 26.14 kg/m   Estimated body mass index is 26.14 kg/m  as calculated from the following:    Height as of this encounter: 1.638 m (5' 4.5\").    Weight as of this encounter: 70.2 kg (154 lb 11.2 oz).  Medication Reconciliation: complete      SHIRLENE Martini      "

## 2019-05-30 NOTE — RESULT ENCOUNTER NOTE
Judson Pena,    Attached are your test results.  -Yeast vaginal infection test is normal - no signs of a yeast infection.  -Bacterial vaginal infection test is POSITIVE.  ADVISE: starting treatment with metronidazole 500mg twice daily orally for 7 days and a prescription has been sent to your pharmacy.  -Trichomonas vaginal infection test is normal - no signs of a trichomonas infection.   Please contact us if you have any questions.    Chyna Hunter, CNP

## 2019-05-30 NOTE — PROGRESS NOTES
Subjective     Libra Pena is a 53 year old female who presents to clinic today for the following health issues:    HPI   Vaginal Symptoms  Onset: 2 weeks    Description:  Vaginal Discharge: none   Itching (Pruritis): no   Burning sensation:  YES  Odor: no     Accompanying Signs & Symptoms:  Pain with Urination: no   Abdominal Pain: no   Fever: no     History:   Sexually active: YES  New Partner: no   Possibility of Pregnancy:  No    Precipitating factors:   Recent Antibiotic Use: no     Alleviating factors:  none    Therapies Tried and outcome: none  No recent antibiotic   Thought was going to be a herpes  and took the Valtrex and did nothing     Patient Active Problem List   Diagnosis     Herpes simplex vulvovaginitis     CARDIOVASCULAR SCREENING; LDL GOAL LESS THAN 160     Postmenopausal bleeding     Ganglion cyst     Family history of coronary artery disease     Overweight (BMI 25.0-29.9)     Other neutropenia (H)     Acute pain of left knee     Knee effusion, left     Past Surgical History:   Procedure Laterality Date     APPENDECTOMY         Social History     Tobacco Use     Smoking status: Never Smoker     Smokeless tobacco: Never Used   Substance Use Topics     Alcohol use: Yes     Comment: occassionally      Family History   Problem Relation Age of Onset     Hypertension Mother         and brother     Cerebrovascular Disease Mother      Arthritis Father      C.A.D. Brother 50        CABG at age 50         Current Outpatient Medications   Medication Sig Dispense Refill     valACYclovir (VALTREX) 500 MG tablet Take 1 tablet (500 mg) by mouth 2 times daily 6 tablet 0     naproxen (NAPROSYN) 500 MG tablet Take 1 tablet (500 mg) by mouth 2 times daily as needed for moderate pain (Patient not taking: Reported on 5/30/2019) 30 tablet 1     No Known Allergies  BP Readings from Last 3 Encounters:   05/30/19 105/70   01/12/18 106/70   11/09/17 106/69    Wt Readings from Last 3 Encounters:   05/30/19 70.2  "kg (154 lb 11.2 oz)   01/12/18 69.9 kg (154 lb)   11/09/17 70.2 kg (154 lb 12.8 oz)           Reviewed and updated as needed this visit by Provider         Review of Systems   ROS COMP: Constitutional, HEENT, cardiovascular, pulmonary, gi and gu systems are negative, except as otherwise noted.      Objective    /70 (BP Location: Right arm, Patient Position: Sitting, Cuff Size: Adult Regular)   Pulse 75   Temp 98.9  F (37.2  C) (Temporal)   Ht 1.638 m (5' 4.5\")   Wt 70.2 kg (154 lb 11.2 oz)   LMP 12/16/2011   SpO2 97%   Breastfeeding? No   BMI 26.14 kg/m    Body mass index is 26.14 kg/m .  Physical Exam   GENERAL APPEARANCE: alert, active and no distress  NECK: normal and supple  RESP: lungs clear to auscultation - no rales, rhonchi or wheezes  CV: regular rates and rhythm and no murmur, click or rub  ABDOMEN: soft, non-tender   (female): negative findings:  external genitalia normal, vaginal mucosa normal, cervix- no lesions, uterine prolapse noted, no cervical motion tenderness, adnexae- no masses, non-tender, Bartholin's glands, urethra, [place] glands negative, positive findings:  vaginal discharge - scant and white  MS: extremities normal- no gross deformities noted  SKIN: Skin color, texture, turgor normal.   NEURO: mentation intact and speech normal  PSYCH: mentation appears normal and affect normal/bright    Diagnostic Test Results:  Results for orders placed or performed in visit on 05/30/19   Wet prep   Result Value Ref Range    Specimen Description Cervix     Wet Prep No Trichomonas seen     Wet Prep Clue cells seen (A)     Wet Prep No yeast seen     Wet Prep WBC'S seen  Few              Assessment & Plan     Libra was seen today for vaginal problem.    Diagnoses and all orders for this visit:    Acute vaginitis  -     Wet prep    Uterine prolapse  -     OB/GYN REFERRAL    Bacterial vaginosis  -     metroNIDAZOLE (FLAGYL) 500 MG tablet; Take 1 tablet (500 mg) by mouth 2 times daily for 7 " "days    PLAN:   Patient needs to follow up in if no improvement,or sooner if worsening of symptoms or other symptoms develop.  CONSULTATION/REFERRAL to Gynecology  Will follow up and/or notify patient of  results via My Chart to determine further need for followup     BMI:   Estimated body mass index is 26.14 kg/m  as calculated from the following:    Height as of this encounter: 1.638 m (5' 4.5\").    Weight as of this encounter: 70.2 kg (154 lb 11.2 oz).   Weight management plan: Discussed healthy diet and exercise guidelines        See Patient Instructions  There are no Patient Instructions on file for this visit.    No follow-ups on file.    Chyna Hunter, BENNIE CNP  [facility]      "

## 2019-08-09 ENCOUNTER — OFFICE VISIT (OUTPATIENT)
Dept: OBGYN | Facility: CLINIC | Age: 54
End: 2019-08-09
Payer: COMMERCIAL

## 2019-08-09 VITALS
SYSTOLIC BLOOD PRESSURE: 100 MMHG | BODY MASS INDEX: 26.45 KG/M2 | WEIGHT: 156.5 LBS | DIASTOLIC BLOOD PRESSURE: 72 MMHG | HEART RATE: 64 BPM

## 2019-08-09 DIAGNOSIS — N81.4 UTERINE PROLAPSE: Primary | ICD-10-CM

## 2019-08-09 PROCEDURE — 99203 OFFICE O/P NEW LOW 30 MIN: CPT | Performed by: OBSTETRICS & GYNECOLOGY

## 2019-08-09 NOTE — PROGRESS NOTES
Subjective  53 year old non-pregnant female presents today complaining of a uterine prolapse.  Patient states for the last 6 months or so she feels like her uterus is falling out.  She has never had to push it in.  She has not had a menses for years.  2 NSVDs.  No problems urinating.  Normal bowel movements.  No constipation.  Patient is sexually active.  No dyspareunia.  No vaginal spotting after.  No vaginal or uterine surgeries.  No tobacco abuse.   We discussed treatment options for uterine prolapse.  Expectant management vs treatment.  Patient is not wanting surgery at this time.          ROS: 10 point ROS neg other than the symptoms noted above in the HPI.  Past Medical History:   Diagnosis Date     Knee pain      Leukopenia      Past Surgical History:   Procedure Laterality Date     APPENDECTOMY       Family History   Problem Relation Age of Onset     Hypertension Mother         and brother     Cerebrovascular Disease Mother      Arthritis Father      C.A.D. Brother 50        CABG at age 50     Social History     Tobacco Use     Smoking status: Never Smoker     Smokeless tobacco: Never Used   Substance Use Topics     Alcohol use: Yes     Comment: occassionally          Objective  Vitals: /72   Pulse 64   Wt 71 kg (156 lb 8 oz)   LMP 12/16/2011   BMI 26.45 kg/m    BMI= Body mass index is 26.45 kg/m .    General appearance=well developed, well-nourished female  Psych=mood is stable, alert and oriented x3  PELVIC:    External genitalia: normal without lesions or masses  Urethral meatus: no lesions or prolapse noted, normal size  Urethra: no masses, non tender  Bladder: non tender, no fullness  Vagina: normal mucosa and rugae, no discharge.  Cervix: normal without lesion, no cervical motion tenderness, healthy, multiparous, larger anterior lip  Uterus: small, mobile, nontender, minimal prolapse noted-Grade 2  Adnexa: non tender, without masses  Rectal: deffered  Ext=no clubbing or cyanosis, no  swelling      Assessment  1.)  Mild uterine prolapse      Plan  1.)  Schedule pessary fitting      30 minutes was spent face to face with the patient today discussing her history, diagnosis, and follow-up plan as noted above.  Over 50% of the visit was spent in counseling and coordination of care.        Nursing notes read and reviewed    Estephania Hart

## 2019-08-09 NOTE — PATIENT INSTRUCTIONS
If you have any questions regarding your visit, Please contact your care team.    Women [facility] CLINIC HOURS TELEPHONE NUMBER   Estephania Hart M.D.    Skylar Benjamin -         Monday-[facility]  7:00 am - 5 pm Monday's Tuesday- [facility]  8:00am- 5 pm  Wednesday- Off  Thursday- Offf Friday-Am [place], and [facility]  [place] 8:00-11:30 AM  [place] 1:00-5:00 PM [facility]  [address] [place] [address]  [phone] ask for [facility]    Imaging [phone]    [facility]  [address] [place], [place] [address] [phone]  Imaging [phone]    [facility]  [address] [place], [place] [id number] [phone]  Imaging Scheduling - [phone]     Urgent Care locations:    [facility] Saturday and Sunday   9 am - 5 pm    Monday-Friday   12 pm - 8 pm  Saturday and Sunday   9 am - 5 pm   [phone]       If you need a medication refill, please contact your pharmacy. Please allow 3 business days for your refill to be completed.  As always, Thank you for trusting us with your healthcare needs!

## 2019-10-02 ENCOUNTER — HEALTH MAINTENANCE LETTER (OUTPATIENT)
Age: 54
End: 2019-10-02

## 2019-10-30 ENCOUNTER — HEALTH MAINTENANCE LETTER (OUTPATIENT)
Age: 54
End: 2019-10-30

## 2019-12-20 ENCOUNTER — OFFICE VISIT (OUTPATIENT)
Dept: PEDIATRICS | Facility: CLINIC | Age: 54
End: 2019-12-20
Payer: COMMERCIAL

## 2019-12-20 VITALS
TEMPERATURE: 97.5 F | HEART RATE: 53 BPM | OXYGEN SATURATION: 98 % | BODY MASS INDEX: 26.09 KG/M2 | DIASTOLIC BLOOD PRESSURE: 80 MMHG | WEIGHT: 154.4 LBS | SYSTOLIC BLOOD PRESSURE: 111 MMHG

## 2019-12-20 DIAGNOSIS — Z12.31 VISIT FOR SCREENING MAMMOGRAM: ICD-10-CM

## 2019-12-20 DIAGNOSIS — R10.11 RIGHT UPPER QUADRANT ABDOMINAL PAIN: Primary | ICD-10-CM

## 2019-12-20 DIAGNOSIS — K21.9 GASTROESOPHAGEAL REFLUX DISEASE WITHOUT ESOPHAGITIS: ICD-10-CM

## 2019-12-20 PROCEDURE — 99214 OFFICE O/P EST MOD 30 MIN: CPT | Performed by: NURSE PRACTITIONER

## 2019-12-20 RX ORDER — NICOTINE POLACRILEX 4 MG/1
20 GUM, CHEWING ORAL DAILY
Qty: 30 TABLET | Refills: 1 | Status: SHIPPED | OUTPATIENT
Start: 2019-12-20 | End: 2023-06-29

## 2019-12-20 NOTE — PATIENT INSTRUCTIONS
PLAN:   1.   Symptomatic therapy suggested: will start on prilosec once a day.  2.  Orders Placed This Encounter   Medications     omeprazole 20 MG tablet     Sig: Take 1 tablet (20 mg) by mouth daily Take 30-60 minutes before a meal.     Dispense:  30 tablet     Refill:  1     Orders Placed This Encounter   Procedures     US Abdomen Complete       3. Patient needs to follow up in if no improvement,or sooner if worsening of symptoms or other symptoms develop.  FURTHER TESTING:       - abdomen ultrasound  Will follow up and/or notify patient of  results via My Chart to determine further need for followup

## 2019-12-20 NOTE — PROGRESS NOTES
Subjective     Libra Pena is a 54 year old female who presents to clinic today for the following health issues:    HPI   ABDOMINAL   PAIN     Onset: 3 weeks    Description:   Character: Dull ache  Location: right upper quadrant  Radiation: occasionally in the back Back    Intensity: mild, moderate    Progression of Symptoms:  improving    Accompanying Signs & Symptoms:  Fever/Chills?: no   Gas/Bloating: YES-when it first started  Nausea: no   Vomitting: no   Diarrhea?: no   Constipation:no   Dysuria or Hematuria: no    History:   Trauma: no   Previous similar pain: YES   Previous tests done: none    Precipitating factors:   Does the pain change with:     Food: YES- in the beginning eat would make it worst     BM: no     Urination: no     Alleviating factors:  none    Therapies Tried and outcome: none    LMP:  not applicable   Pain started about a couple weeks ago  If she aches it will ache more   Has been a little better in the last week but still is present   No nausea or vomiting       Patient Active Problem List   Diagnosis     Herpes simplex vulvovaginitis     CARDIOVASCULAR SCREENING; LDL GOAL LESS THAN 160     Ganglion cyst     Family history of coronary artery disease     Overweight (BMI 25.0-29.9)     Other neutropenia (H)     Acute pain of left knee     Knee effusion, left     Uterine prolapse     Past Surgical History:   Procedure Laterality Date     ABDOMEN SURGERY  1987    apendectomy     APPENDECTOMY         Social History     Tobacco Use     Smoking status: Never Smoker     Smokeless tobacco: Never Used   Substance Use Topics     Alcohol use: Yes     Comment: occassionally      Family History   Problem Relation Age of Onset     Hypertension Mother         and brother     Cerebrovascular Disease Mother      Arthritis Father      C.A.D. Brother 50        CABG at age 50         Current Outpatient Medications   Medication Sig Dispense Refill     naproxen (NAPROSYN) 500 MG tablet Take 1 tablet  (500 mg) by mouth 2 times daily as needed for moderate pain 30 tablet 1     valACYclovir (VALTREX) 500 MG tablet Take 1 tablet (500 mg) by mouth 2 times daily 6 tablet 0     No Known Allergies  BP Readings from Last 3 Encounters:   12/20/19 111/80   08/09/19 100/72   05/30/19 105/70    Wt Readings from Last 3 Encounters:   12/20/19 70 kg (154 lb 6.4 oz)   08/09/19 71 kg (156 lb 8 oz)   05/30/19 70.2 kg (154 lb 11.2 oz)           Reviewed and updated as needed this visit by Provider         Review of Systems   ROS COMP: CONSTITUTIONAL:NEGATIVE for fever, chills, change in weight  ENT/MOUTH: NEGATIVE for ear, mouth and throat problems  RESP:NEGATIVE for significant cough or SOB  CV: NEGATIVE for chest pain/chest pressure  GI: POSITIVE for abdominal pain RUQ and NEGATIVE for jaundice, melena, nausea, vomiting and weight loss  MUSCULOSKELETAL: NEGATIVE for significant arthralgias or myalgia  NEURO: NEGATIVE for weakness, dizziness or paresthesias  ENDOCRINE: NEGATIVE for polydipsia, polyphagia and polyuria  HEME/ALLERGY/IMMUNE: NEGATIVE for bleeding problems      Objective    /80 (BP Location: Right arm, Patient Position: Sitting, Cuff Size: Adult Regular)   Pulse 53   Temp 97.5  F (36.4  C) (Temporal)   Wt 70 kg (154 lb 6.4 oz)   LMP 12/16/2011   SpO2 98%   Breastfeeding No   BMI 26.09 kg/m    Body mass index is 26.09 kg/m .  Physical Exam   GENERAL APPEARANCE: alert, active and no distress  RESP: lungs clear to auscultation - no rales, rhonchi or wheezes  CV: regular rates and rhythm and no murmur, click or rub  ABDOMEN: mild tenderness in the RUQ area, no rebound tenderness, no guarding or rigidity, no herniae noted, no masses noted  MS: extremities normal- no gross deformities noted  SKIN: no suspicious lesions or rashes  PSYCH: mentation appears normal and affect normal/bright  MENTAL STATUS EXAM:  Appearance/Behavior: No apparent distress, Casually groomed and Dressed appropriately for  weather  Speech: Normal  Mood/Affect: normal affect  Insight: Adequate    Diagnostic Test Results:  Pending orders and results           Assessment & Plan     Libra was seen today for abdominal pain.    Diagnoses and all orders for this visit:    Right upper quadrant abdominal pain  - -     US Abdomen Complete; Future  -     omeprazole 20 MG tablet; Take 1 tablet (20 mg) by mouth daily Take 30-60 minutes before a meal.  -     NM Hepatobiliary Scan w GB EF; Future    Gastroesophageal reflux disease without esophagitis  -     omeprazole 20 MG tablet; Take 1 tablet (20 mg) by mouth daily Take 30-60 minutes before a meal.  Discussed that these symptoms is likely related to reflux or GERD. Discussed non-pharmacologic treatment, including elevating the head of bed, decrease food before bedtime and decreased caffeine and nicotine and alcohol.  Went over meds for reflux. Pt will try prilosec. Recheck if not improving as expected.       See Patient Instructions  Patient Instructions     PLAN:   1.   Symptomatic therapy suggested: will start on prilosec once a day.  2.  Orders Placed This Encounter   Medications     omeprazole 20 MG tablet     Sig: Take 1 tablet (20 mg) by mouth daily Take 30-60 minutes before a meal.     Dispense:  30 tablet     Refill:  1     Orders Placed This Encounter   Procedures     US Abdomen Complete       3. Patient needs to follow up in if no improvement,or sooner if worsening of symptoms or other symptoms develop.  FURTHER TESTING:       - abdomen ultrasound  Will follow up and/or notify patient of  results via My Chart to determine further need for followup    No follow-ups on file.    BENNIE Castillo CNP  [facility]

## 2019-12-20 NOTE — NURSING NOTE
"Chief Complaint   Patient presents with     Abdominal Pain     RUQ abdominal pain x 3 weeks       Initial /80 (BP Location: Right arm, Patient Position: Sitting, Cuff Size: Adult Regular)   Pulse 53   Temp 97.5  F (36.4  C) (Temporal)   Wt 70 kg (154 lb 6.4 oz)   LMP 12/16/2011   SpO2 98%   Breastfeeding No   BMI 26.09 kg/m   Estimated body mass index is 26.09 kg/m  as calculated from the following:    Height as of 5/30/19: 1.638 m (5' 4.5\").    Weight as of this encounter: 70 kg (154 lb 6.4 oz).  Medication Reconciliation: complete      SHIRLENE aMrtini      "

## 2019-12-28 ENCOUNTER — ANCILLARY PROCEDURE (OUTPATIENT)
Dept: ULTRASOUND IMAGING | Facility: CLINIC | Age: 54
End: 2019-12-28
Attending: NURSE PRACTITIONER
Payer: COMMERCIAL

## 2019-12-28 DIAGNOSIS — R10.11 RIGHT UPPER QUADRANT ABDOMINAL PAIN: ICD-10-CM

## 2019-12-28 PROCEDURE — 76700 US EXAM ABDOM COMPLETE: CPT | Performed by: RADIOLOGY

## 2019-12-29 NOTE — RESULT ENCOUNTER NOTE
Judson Pena,    Attached are your test results.  Ultrasound is normal   If persistent symptoms could consider scan of gallbladder to see how the gallbladder functions   I will place order. Please call [phone] to schedule.     Please contact us if you have any questions.    Chyna Hunter, CNP

## 2021-01-15 ENCOUNTER — HEALTH MAINTENANCE LETTER (OUTPATIENT)
Age: 56
End: 2021-01-15

## 2021-03-21 ENCOUNTER — HEALTH MAINTENANCE LETTER (OUTPATIENT)
Age: 56
End: 2021-03-21

## 2021-06-03 NOTE — PROGRESS NOTES
SUBJECTIVE:   CC: Libra Pena is an 55 year old woman who presents for preventive health visit.     Patient has been advised of split billing requirements and indicates understanding: Yes  Healthy Habits:     Getting at least 3 servings of Calcium per day:  Yes    Bi-annual eye exam:  Yes    Dental care twice a year:  Yes    Sleep apnea or symptoms of sleep apnea:  None    Diet:  Regular (no restrictions) and Gluten-free/reduced    Frequency of exercise:  2-3 days/week    Duration of exercise:  15-30 minutes    Taking medications regularly:  Yes    Medication side effects:  Not applicable    PHQ-2 Total Score: 0    Additional concerns today:  No      Today's PHQ-2 Score:   PHQ-2 ( 1999 [employer]) 6/4/2021   Q1: Little interest or pleasure in doing things 0   Q2: Feeling down, depressed or hopeless 0   PHQ-2 Score 0   Q1: Little interest or pleasure in doing things Not at all   Q2: Feeling down, depressed or hopeless Not at all   PHQ-2 Score 0       Abuse: Current or Past (Physical, Sexual or Emotional) - No  Do you feel safe in your environment? Yes    Have you ever done Advance Care Planning? (For example, a Health Directive, POLST, or a discussion with a medical provider or your loved ones about your wishes): Yes, patient states has an Advance Care Planning document and will bring a copy to the clinic.    Social History     Tobacco Use     Smoking status: Never Smoker     Smokeless tobacco: Never Used   Substance Use Topics     Alcohol use: Yes     Comment: occassionally      If you drink alcohol do you typically have >3 drinks per day or >7 drinks per week? No    Alcohol Use 6/4/2021   Prescreen: >3 drinks/day or >7 drinks/week? No   Prescreen: >3 drinks/day or >7 drinks/week? -       Reviewed orders with patient.  Reviewed health maintenance and updated orders accordingly - Yes  Lab work is in process  Labs reviewed in EPIC  BP Readings from Last 3 Encounters:   06/04/21 130/64   12/20/19 111/80    08/09/19 100/72    Wt Readings from Last 3 Encounters:   06/04/21 69.4 kg (153 lb)   12/20/19 70 kg (154 lb 6.4 oz)   08/09/19 71 kg (156 lb 8 oz)                  Patient Active Problem List   Diagnosis     Herpes simplex vulvovaginitis     CARDIOVASCULAR SCREENING; LDL GOAL LESS THAN 160     Ganglion cyst     Family history of coronary artery disease     Overweight (BMI 25.0-29.9)     Other neutropenia (H)     Acute pain of left knee     Knee effusion, left     Uterine prolapse     Past Surgical History:   Procedure Laterality Date     ABDOMEN SURGERY  1987    apendectomy     APPENDECTOMY         Social History     Tobacco Use     Smoking status: Never Smoker     Smokeless tobacco: Never Used   Substance Use Topics     Alcohol use: Yes     Comment: occassionally      Family History   Problem Relation Age of Onset     Hypertension Mother         and brother     Cerebrovascular Disease Mother      Arthritis Father      C.A.D. Brother 50        CABG at age 50     Diabetes Brother      Depression Sister      Asthma Sister          Current Outpatient Medications   Medication Sig Dispense Refill     naproxen (NAPROSYN) 500 MG tablet Take 1 tablet (500 mg) by mouth 2 times daily as needed for moderate pain (Patient not taking: Reported on 6/4/2021) 30 tablet 1     omeprazole 20 MG tablet Take 1 tablet (20 mg) by mouth daily Take 30-60 minutes before a meal. (Patient not taking: Reported on 6/4/2021) 30 tablet 1     valACYclovir (VALTREX) 500 MG tablet Take 1 tablet (500 mg) by mouth 2 times daily (Patient not taking: Reported on 6/4/2021) 6 tablet 0     No Known Allergies    Breast Cancer Screening:  Any new diagnosis of family breast, ovarian, or bowel cancer? No      Mammogram Screening: Recommended mammography every 1-2 years with patient discussion and risk factor consideration  Pertinent mammograms are reviewed under the imaging tab.    History of abnormal Pap smear: NO - age 30-65 PAP every 5 years with negative  "HPV co-testing recommended  PAP / HPV Latest Ref Rng & Units 6/4/2021 6/4/2015 1/19/2012   PAP - NIL NIL NIL   HPV 16 DNA NEG - Negative -   HPV 18 DNA NEG - Negative -   OTHER HR HPV NEG - Negative -     Reviewed and updated as needed this visit by clinical staff                 Reviewed and updated as needed this visit by Provider                Past Medical History:   Diagnosis Date     Arthritis 2012    osteo arthritis in hands     Knee pain      Leukopenia      Other neutropenia (H)       Past Surgical History:   Procedure Laterality Date     ABDOMEN SURGERY  1987    apendectomy     APPENDECTOMY         Review of Systems   Constitutional: Negative for chills and fever.   HENT: Negative for congestion, ear pain, hearing loss and sore throat.    Eyes: Negative for pain and visual disturbance.   Respiratory: Negative for cough and shortness of breath.    Cardiovascular: Negative for chest pain, palpitations and peripheral edema.   Gastrointestinal: Negative for abdominal pain, constipation, diarrhea, heartburn, hematochezia and nausea.   Breasts:  Negative for tenderness, breast mass and discharge.   Genitourinary: Negative for dysuria, frequency, genital sores, hematuria, pelvic pain, urgency, vaginal bleeding and vaginal discharge.   Musculoskeletal: Negative for arthralgias, joint swelling and myalgias.   Skin: Negative for rash.   Neurological: Negative for dizziness, weakness, headaches and paresthesias.   Psychiatric/Behavioral: Negative for mood changes. The patient is not nervous/anxious.          OBJECTIVE:   /64   Pulse 63   Temp 97.7  F (36.5  C) (Tympanic)   Resp 14   Ht 1.632 m (5' 4.25\")   Wt 69.4 kg (153 lb)   LMP 12/16/2011   SpO2 100%   BMI 26.06 kg/m    Physical Exam  GENERAL: healthy, alert and no distress  EYES: Eyes grossly normal to inspection and conjunctivae and sclerae normal  HENT: ear canals and TM's normal, nose and mouth without ulcers or lesions  NECK: no adenopathy, no " asymmetry, masses, or scars and thyroid normal to palpation  RESP: lungs clear to auscultation - no rales, rhonchi or wheezes  BREAST: normal without masses, tenderness or nipple discharge and no palpable axillary masses or adenopathy  CV: regular rates and rhythm, no murmur, click or rub, peripheral pulses strong and no peripheral edema  ABDOMEN: soft, nontender, no hepatosplenomegaly, no masses and bowel sounds normal   (female): normal female external genitalia, normal urethral meatus, vaginal mucosa pink, moist, well rugated, and normal cervix/adnexa/uterus without masses or discharge  MS: no gross musculoskeletal defects noted, no edema  SKIN: no suspicious lesions or rashes  NEURO: Normal strength and tone, mentation intact and speech normal  PSYCH: mentation appears normal, affect normal/bright  LYMPH: no cervical, supraclavicular, axillary, or inguinal adenopathy    Diagnostic Test Results:  Labs reviewed in Epic  Results for orders placed or performed in visit on 06/04/21   Pap imaged thin layer screen with HPV - recommended age 30 - 65 years (select HPV order below)     Status: None   Result Value Ref Range    PAP NIL     Copath Report         Patient Name: CHRIS MAGANA  MR#: [id number]  Specimen #: [id number]  Collected: 6/4/2021  Received: 6/4/2021  Reported: 6/8/2021 08:42  Ordering Phy(s): VENANCIO FITCH    For improved result formatting, select 'View Enhanced Report Format' under   Linked Documents section.    SPECIMEN/STAIN PROCESS:  Pap imaged thin layer prep screening (Surepath, FocalPoint with guided   screening)       Pap-Cyto x 1, HPV ordered x 1    SOURCE: Cervical, endocervical  ----------------------------------------------------------------   Pap imaged thin layer prep screening (Surepath, FocalPoint with guided   screening)  SPECIMEN ADEQUACY:  Satisfactory for evaluation.  -Transformation zone component present.    CYTOLOGIC INTERPRETATION:    Negative for intraepithelial  lesion or malignancy    Electronically signed out by:  CALISTA Wallace (ASCP)    CLINICAL HISTORY:  LMP: 12/16/2011  A previous normal pap  Date of Last Pap: 06/04/2015,    Papanicolaou Test Limitations:  Cervical cytology  is a screening test with   limited sensitivity; regular  screening is critical for cancer prevention; Pap tests are primarily   effective for the diagnosis/prevention of  squamous cell carcinoma, not adenocarcinomas or other cancers.    COLLECTION SITE:  Client:  [facility]  Location: [facility] (B)    The technical component of this testing was completed at the [facility], with the professional component performed   at the [facility], [address] [place] [phone] ([phone])       Lipid panel reflex to direct LDL Fasting     Status: None   Result Value Ref Range    Cholesterol 156 <200 mg/dL    Triglycerides 46 <150 mg/dL    HDL Cholesterol 62 >49 mg/dL    LDL Cholesterol Calculated 85 <100 mg/dL    Non HDL Cholesterol 94 <130 mg/dL   Comprehensive metabolic panel     Status: None   Result Value Ref Range    Sodium 138 133 - 144 mmol/L    Potassium 3.8 3.4 - 5.3 mmol/L    Chloride 105 94 - 109 mmol/L    Carbon Dioxide 27 20 - 32 mmol/L    Anion Gap 6 3 - 14 mmol/L    Glucose 82 70 - 99 mg/dL    Urea Nitrogen 14 7 - 30 mg/dL    Creatinine 0.79 0.52 - 1.04 mg/dL    GFR Estimate 83 >60 mL/min/[1.73_m2]    GFR Estimate If Black >90 >60 mL/min/[1.73_m2]    Calcium 9.3 8.5 - 10.1 mg/dL    Bilirubin Total 0.5 0.2 - 1.3 mg/dL    Albumin 4.1 3.4 - 5.0 g/dL    Protein Total 7.5 6.8 - 8.8 g/dL    Alkaline Phosphatase 73 40 - 150 U/L    ALT 24 0 - 50 U/L    AST 16 0 - 45 U/L   TSH with free T4 reflex     Status: None   Result Value Ref Range    TSH 1.98 0.40 - 4.00 mU/L   CBC with platelets     Status: None   Result Value Ref Range    WBC  CBCD ORDER 4.0 - 11.0 10e9/L    RBC Count CBCD ORDER 3.8 - 5.2 10e12/L    Hemoglobin CBCD ORDER 11.7 - 15.7 g/dL    Hematocrit CBCD ORDER 35.0 - 47.0 %    MCV CBCD ORDER 78 - 100 fl    MCH CBCD ORDER 26.5 - 33.0 pg    MCHC CBCD ORDER 31.5 - 36.5 g/dL    RDW CBCD ORDER 10.0 - 15.0 %    Platelet Count CBCD ORDER 150 - 450 10e9/L   Blood Morphology Pathologist Review     Status: None   Result Value Ref Range    Copath Report       Patient Name: CHRIS MAGANA  MR#: [id number]  Specimen #: [id number]  Collected: 6/4/2021  Received: 6/7/2021  Reported: 6/8/2021 17:28  Ordering Phy(s): VENANCIO FITCH    For improved result formatting, select 'View Enhanced Report Format' under   Linked Documents section.    TEST(S):  Peripheral Smear Morphology    FINAL DIAGNOSIS:  PERIPHERAL BLOOD MORPHOLOGY:  - Moderate leukopenia with mild absolute neutropenia.  - Mild thrombocytopenia.    COMMENT:  Leukopenia with neutropenia in an adult may be seen in association with   drug reactions, certain viral  infections, vitamin deficiencies, primary hematologic disorder, autoimmune   processes, hypersplenism and copper  deficiency, among other causes.    Thrombocytopenia may be due to increased platelet destruction (immune   mediated such as idiopathic  thrombocytopenic purpura, SLE, drug induced), due to infections, due to   hypersplenism or due to bone marrow  suppression (i.e. bone marrow infiltrative process, myelodysp lastic   syndrome, or toxin - such as alcohol,  viral or drug induced), among other causes.    Electronically signed out by:    Mundo Sun M.D.    CLINICAL HISTORY:  55 year old female.  Leukopenia and thrombocytopenia.  The patient uses   alcohol.    PERIPHERAL BLOOD DATA:  NOTE:  The automated total and differential blood cell counts provided   below under Clinical Lab Results  correlate with microscopic examination of the peripheral blood smear.    PERIPHERAL BLOOD MORPHOLOGY:    ERYTHROCYTES:  The red  cells are normocytic, normochromic and normal in   number for the patient's age and  gender.  Anisopoikilocytosis is minimal.  No features of hemolysis or   increased polychromasia are identified.  No intracellular microorganisms are identified.    LEUKOCYTES:  The leukocytes are moderately decreased in number, and are   normal in morphology and differential  distribution, except there is mild absolute neutropenia. No immature   precursors or evidence of neutrophilic  dysplasia i s seen. No atypical lymphoid cells are seen. No intracellular   microorganisms are identified.    PLATELETS:  The platelets are mildly decreased in number and are normal in   morphology.    CLINICAL LAB RESULTS:  Battery Order No. Lab Test Code Clinical Result Ref. Range Units Result   Date  Hemogram/Diff/PLT F71027 BA WBC Count L 2.7 4.0-11.0 10e9/L 6/4/2021 11:35       RBC Count 4.49 3.8-5.2 10e12/L 6/4/2021 11:35       Hemoglobin 13.0 11.7-15.7 g/dL 6/4/2021 11:35       Hematocrit 39.8 35.0-47.0 % 6/4/2021 11:35       MCV 89  fl 6/4/2021 11:35       MCH 29.0 26.5-33.0 pg 6/4/2021 11:35       MCHC 32.7 31.5-36.5 g/dL 6/4/2021 11:35       RDW 14.0 10.0-15.0 % 6/4/2021 11:35       Platelet Count L 146 150-450 10e9/L 6/4/2021 11:35       % Neutrophils 52.6  % 6/4/2021 11:35       % Lymphocytes 35.2  % 6/4/2021 11:35       % Monocytes 10.4  % 6/4/2021 11:35       % Eosinophils 1.1  % 6/4/2021 11:35       % Basophils 0.7  % 6/4/2021 11:35       abs Neutrophils L 1.4 1.6-8.3 10e9/L 6 /4/2021 11:35       abs Lymphocytes 1.0 0.8-5.3 10e9/L 6/4/2021 11:35       abs Monocytes 0.3 0.0-1.3 10e9/L 6/4/2021 11:35       abs Eosinophils 0.0 0.0-0.7 10e9/L 6/4/2021 11:35       abs Basophils 0.0 0.0-0.2 10e9/L 6/4/2021 11:35        SEE TEXT   6/4/2021 11:35       Text/Comments:  Automated Method    The technical component of this testing was completed at the [facility], with the professional  component performed   at the [facility], [address] [place]  [phone] ([phone])    CPT Codes:  A: 27069-ZTGV    COLLECTION SITE:  Client:  [facility]  Location:  [facility] (B)       Reticulocyte count     Status: None   Result Value Ref Range    % Retic 0.9 0.5 - 2.0 %    Absolute Retic 38.7 25 - 95 10e9/L   CBC with platelets and differential     Status: Abnormal   Result Value Ref Range    WBC 2.7 (L) 4.0 - 11.0 10e9/L    RBC Count 4.49 3.8 - 5.2 10e12/L    Hemoglobin 13.0 11.7 - 15.7 g/dL    Hematocrit 39.8 35.0 - 47.0 %    MCV 89 78 - 100 fl    MCH 29.0 26.5 - 33.0 pg    MCHC 32.7 31.5 - 36.5 g/dL    RDW 14.0 10.0 - 15.0 %    Platelet Count 146 (L) 150 - 450 10e9/L    % Neutrophils 52.6 %    % Lymphocytes 35.2 %    % Monocytes 10.4 %    % Eosinophils 1.1 %    % Basophils 0.7 %    Absolute Neutrophil 1.4 (L) 1.6 - 8.3 10e9/L    Absolute Lymphocytes 1.0 0.8 - 5.3 10e9/L    Absolute Monocytes 0.3 0.0 - 1.3 10e9/L    Absolute Eosinophils 0.0 0.0 - 0.7 10e9/L    Absolute Basophils 0.0 0.0 - 0.2 10e9/L    Diff Method Automated Method        ASSESSMENT/PLAN:   Libra was seen today for physical.    Diagnoses and all orders for this visit:    Routine general medical examination at a health care facility  -     Reticulocyte count  -     Cancel: WBC Differential    Screening for malignant neoplasm of cervix  -     Pap imaged thin layer screen with HPV - recommended age 30 - 65 years (select HPV order below)  -     HPV High Risk Types DNA Cervical    Screen for colon cancer  -     GASTROENTEROLOGY ADULT REF PROCEDURE ONLY; Future    Encounter for screening mammogram for breast cancer  -     *MA Screening Digital Bilateral; Future    CARDIOVASCULAR SCREENING; LDL GOAL LESS THAN 160  -     Lipid panel reflex to direct LDL Fasting    Screening for diabetes mellitus (DM)  -     JUST IN CASE  -     Comprehensive metabolic panel    Screening  for thyroid disorder  -     TSH with free T4 reflex    Screening for disorder of blood and blood-forming organs  -     CBC with platelets    Menopause ovarian failure  -     DX Hip/Pelvis/Spine; Future    Thrombocytopenia (H)  -     Blood Morphology Pathologist Review  -     CBC with platelets and differential  -     Oncology/Hematology Adult Referral; Future    Leukopenia, unspecified type  -     Blood Morphology Pathologist Review  -     CBC with platelets and differential  -     Oncology/Hematology Adult Referral; Future    Other orders  -     REVIEW OF HEALTH MAINTENANCE PROTOCOL ORDERS    PLAN:   Patient needs to follow up in if no improvement,or sooner if worsening of symptoms or other symptoms develop.  FURTHER TESTING:       - DXA (bone density) scan       - mammogram  I will place order. Please call [phone] to schedule.  CONSULTATION/REFERRAL to colonoscopy  Please call [phone] to make appointment  if you do not hear from referrals in the next few days.   Will follow up and/or notify patient of  results via My Chart to determine further need for followup  Follow up office visit in one year for annual health maintenance exam, sooner PRN.    Patient has been advised of split billing requirements and indicates understanding: Yes  COUNSELING:  Reviewed preventive health counseling, as reflected in patient instructions  Special attention given to:        Regular exercise       Healthy diet/nutrition       Vision screening       Osteoporosis prevention/bone health       Colon cancer screening       Consider Hep C screening for all patients one time for ages 18-79 years       HIV screeninx in teen years, 1x in adult years, and at intervals if high risk       Consider lung cancer screening for ages 55-80 years and 30 pack-year smoking history        The 10-year ASCVD risk score (Roxanne LEBRON Jr., et al., 2013) is: 1.6%    Values used to calculate the score:      Age: 55 years      Sex: Female      Is  "Non- : No      Diabetic: No      Tobacco smoker: No      Systolic Blood Pressure: 130 mmHg      Is BP treated: No      HDL Cholesterol: 62 mg/dL      Total Cholesterol: 156 mg/dL       Advance Care Planning    Estimated body mass index is 26.09 kg/m  as calculated from the following:    Height as of 5/30/19: 1.638 m (5' 4.5\").    Weight as of 12/20/19: 70 kg (154 lb 6.4 oz).    Weight management plan: Discussed healthy diet and exercise guidelines    She reports that she has never smoked. She has never used smokeless tobacco.      Counseling Resources:  ATP IV Guidelines  Pooled Cohorts Equation Calculator  Breast Cancer Risk Calculator  BRCA-Related Cancer Risk Assessment: FHS-7 Tool  FRAX Risk Assessment  ICSI Preventive Guidelines  Dietary Guidelines for Americans, 2010  USDA's MyPlate  ASA Prophylaxis  Lung CA Screening    Chyna Hunter, BENNIE CNP  [facility]  "

## 2021-06-03 NOTE — PATIENT INSTRUCTIONS
PLAN:   1.   Symptomatic therapy suggested: Increase calcium to 1000mg and 1000iu Vit D  2.  Orders Placed This Encounter   Procedures     REVIEW OF HEALTH MAINTENANCE PROTOCOL ORDERS     *MA Screening Digital Bilateral     DX Hip/Pelvis/Spine     Pap imaged thin layer screen with HPV - recommended age 30 - 65 years (select HPV order below)     HPV High Risk Types DNA Cervical     Lipid panel reflex to direct LDL Fasting     JUST IN CASE     Comprehensive metabolic panel     TSH with free T4 reflex     CBC with platelets     GASTROENTEROLOGY ADULT REF PROCEDURE ONLY       3. Patient needs to follow up in if no improvement,or sooner if worsening of symptoms or other symptoms develop.  FURTHER TESTING:       - DXA (bone density) scan       - mammogram  I will place order. Please call [phone] to schedule.  CONSULTATION/REFERRAL to colonoscopy  Please call [phone] to make appointment  if you do not hear from referrals in the next few days.   Will follow up and/or notify patient of  results via My Chart to determine further need for followup  Follow up office visit in one year for annual health maintenance exam, sooner PRN.    Preventive Health Recommendations  Female Ages 50 - 64    Yearly exam: See your health care provider every year in order to  o Review health changes.   o Discuss preventive care.    o Review your medicines if your doctor has prescribed any.      Get a Pap test every three years (unless you have an abnormal result and your provider advises testing more often).    If you get Pap tests with HPV test, you only need to test every 5 years, unless you have an abnormal result.     You do not need a Pap test if your uterus was removed (hysterectomy) and you have not had cancer.    You should be tested each year for STDs (sexually transmitted diseases) if you're at risk.     Have a mammogram every 1 to 2 years.    Have a colonoscopy at age 50, or have a yearly FIT test (stool test). These exams  screen for colon cancer.      Have a cholesterol test every 5 years, or more often if advised.    Have a diabetes test (fasting glucose) every three years. If you are at risk for diabetes, you should have this test more often.     If you are at risk for osteoporosis (brittle bone disease), think about having a bone density scan (DEXA).    Shots: Get a flu shot each year. Get a tetanus shot every 10 years.    Nutrition:     Eat at least 5 servings of fruits and vegetables each day.    Eat whole-grain bread, whole-wheat pasta and brown rice instead of white grains and rice.    Get adequate Calcium and Vitamin D.     Lifestyle    Exercise at least 150 minutes a week (30 minutes a day, 5 days a week). This will help you control your weight and prevent disease.    Limit alcohol to one drink per day.    No smoking.     Wear sunscreen to prevent skin cancer.     See your dentist every six months for an exam and cleaning.    See your eye doctor every 1 to 2 years.

## 2021-06-04 ENCOUNTER — OFFICE VISIT (OUTPATIENT)
Dept: FAMILY MEDICINE | Facility: CLINIC | Age: 56
End: 2021-06-04
Payer: COMMERCIAL

## 2021-06-04 VITALS
WEIGHT: 153 LBS | TEMPERATURE: 97.7 F | OXYGEN SATURATION: 100 % | RESPIRATION RATE: 14 BRPM | HEART RATE: 63 BPM | SYSTOLIC BLOOD PRESSURE: 130 MMHG | BODY MASS INDEX: 26.12 KG/M2 | HEIGHT: 64 IN | DIASTOLIC BLOOD PRESSURE: 64 MMHG

## 2021-06-04 DIAGNOSIS — D72.819 LEUKOPENIA, UNSPECIFIED TYPE: ICD-10-CM

## 2021-06-04 DIAGNOSIS — Z12.11 SCREEN FOR COLON CANCER: ICD-10-CM

## 2021-06-04 DIAGNOSIS — Z13.0 SCREENING FOR DISORDER OF BLOOD AND BLOOD-FORMING ORGANS: ICD-10-CM

## 2021-06-04 DIAGNOSIS — Z00.00 ROUTINE GENERAL MEDICAL EXAMINATION AT A HEALTH CARE FACILITY: Primary | ICD-10-CM

## 2021-06-04 DIAGNOSIS — Z13.6 CARDIOVASCULAR SCREENING; LDL GOAL LESS THAN 160: ICD-10-CM

## 2021-06-04 DIAGNOSIS — Z13.1 SCREENING FOR DIABETES MELLITUS (DM): ICD-10-CM

## 2021-06-04 DIAGNOSIS — E28.39 MENOPAUSE OVARIAN FAILURE: ICD-10-CM

## 2021-06-04 DIAGNOSIS — D69.6 THROMBOCYTOPENIA (H): ICD-10-CM

## 2021-06-04 DIAGNOSIS — Z12.31 ENCOUNTER FOR SCREENING MAMMOGRAM FOR BREAST CANCER: ICD-10-CM

## 2021-06-04 DIAGNOSIS — Z13.29 SCREENING FOR THYROID DISORDER: ICD-10-CM

## 2021-06-04 DIAGNOSIS — Z12.4 SCREENING FOR MALIGNANT NEOPLASM OF CERVIX: ICD-10-CM

## 2021-06-04 LAB
ALBUMIN SERPL-MCNC: 4.1 G/DL (ref 3.4–5)
ALP SERPL-CCNC: 73 U/L (ref 40–150)
ALT SERPL W P-5'-P-CCNC: 24 U/L (ref 0–50)
ANION GAP SERPL CALCULATED.3IONS-SCNC: 6 MMOL/L (ref 3–14)
AST SERPL W P-5'-P-CCNC: 16 U/L (ref 0–45)
BASOPHILS # BLD AUTO: 0 10E9/L (ref 0–0.2)
BASOPHILS NFR BLD AUTO: 0.7 %
BILIRUB SERPL-MCNC: 0.5 MG/DL (ref 0.2–1.3)
BUN SERPL-MCNC: 14 MG/DL (ref 7–30)
CALCIUM SERPL-MCNC: 9.3 MG/DL (ref 8.5–10.1)
CHLORIDE SERPL-SCNC: 105 MMOL/L (ref 94–109)
CHOLEST SERPL-MCNC: 156 MG/DL
CO2 SERPL-SCNC: 27 MMOL/L (ref 20–32)
CREAT SERPL-MCNC: 0.79 MG/DL (ref 0.52–1.04)
DIFFERENTIAL METHOD BLD: ABNORMAL
EOSINOPHIL # BLD AUTO: 0 10E9/L (ref 0–0.7)
EOSINOPHIL NFR BLD AUTO: 1.1 %
ERYTHROCYTE [DISTWIDTH] IN BLOOD BY AUTOMATED COUNT: 14 % (ref 10–15)
ERYTHROCYTE [DISTWIDTH] IN BLOOD BY AUTOMATED COUNT: NORMAL % (ref 10–15)
GFR SERPL CREATININE-BSD FRML MDRD: 83 ML/MIN/{1.73_M2}
GLUCOSE SERPL-MCNC: 82 MG/DL (ref 70–99)
HCT VFR BLD AUTO: 39.8 % (ref 35–47)
HCT VFR BLD AUTO: NORMAL % (ref 35–47)
HDLC SERPL-MCNC: 62 MG/DL
HGB BLD-MCNC: 13 G/DL (ref 11.7–15.7)
HGB BLD-MCNC: NORMAL G/DL (ref 11.7–15.7)
LDLC SERPL CALC-MCNC: 85 MG/DL
LYMPHOCYTES # BLD AUTO: 1 10E9/L (ref 0.8–5.3)
LYMPHOCYTES NFR BLD AUTO: 35.2 %
MCH RBC QN AUTO: 29 PG (ref 26.5–33)
MCH RBC QN AUTO: NORMAL PG (ref 26.5–33)
MCHC RBC AUTO-ENTMCNC: 32.7 G/DL (ref 31.5–36.5)
MCHC RBC AUTO-ENTMCNC: NORMAL G/DL (ref 31.5–36.5)
MCV RBC AUTO: 89 FL (ref 78–100)
MCV RBC AUTO: NORMAL FL (ref 78–100)
MONOCYTES # BLD AUTO: 0.3 10E9/L (ref 0–1.3)
MONOCYTES NFR BLD AUTO: 10.4 %
NEUTROPHILS # BLD AUTO: 1.4 10E9/L (ref 1.6–8.3)
NEUTROPHILS NFR BLD AUTO: 52.6 %
NONHDLC SERPL-MCNC: 94 MG/DL
PLATELET # BLD AUTO: 146 10E9/L (ref 150–450)
PLATELET # BLD AUTO: NORMAL 10E9/L (ref 150–450)
POTASSIUM SERPL-SCNC: 3.8 MMOL/L (ref 3.4–5.3)
PROT SERPL-MCNC: 7.5 G/DL (ref 6.8–8.8)
RBC # BLD AUTO: 4.49 10E12/L (ref 3.8–5.2)
RBC # BLD AUTO: NORMAL 10E12/L (ref 3.8–5.2)
RETICS # AUTO: 38.7 10E9/L (ref 25–95)
RETICS/RBC NFR AUTO: 0.9 % (ref 0.5–2)
SODIUM SERPL-SCNC: 138 MMOL/L (ref 133–144)
TRIGL SERPL-MCNC: 46 MG/DL
TSH SERPL DL<=0.005 MIU/L-ACNC: 1.98 MU/L (ref 0.4–4)
WBC # BLD AUTO: 2.7 10E9/L (ref 4–11)
WBC # BLD AUTO: NORMAL 10E9/L (ref 4–11)

## 2021-06-04 PROCEDURE — 80050 GENERAL HEALTH PANEL: CPT | Performed by: NURSE PRACTITIONER

## 2021-06-04 PROCEDURE — 87624 HPV HI-RISK TYP POOLED RSLT: CPT | Performed by: NURSE PRACTITIONER

## 2021-06-04 PROCEDURE — 99N1109 PR STATISTIC MORPHOLOGY W/INTERP HISTOLOGY TC 85060: Performed by: NURSE PRACTITIONER

## 2021-06-04 PROCEDURE — G0145 SCR C/V CYTO,THINLAYER,RESCR: HCPCS | Performed by: NURSE PRACTITIONER

## 2021-06-04 PROCEDURE — 80061 LIPID PANEL: CPT | Performed by: NURSE PRACTITIONER

## 2021-06-04 PROCEDURE — 99396 PREV VISIT EST AGE 40-64: CPT | Performed by: NURSE PRACTITIONER

## 2021-06-04 PROCEDURE — 85025 COMPLETE CBC W/AUTO DIFF WBC: CPT | Performed by: NURSE PRACTITIONER

## 2021-06-04 PROCEDURE — 36415 COLL VENOUS BLD VENIPUNCTURE: CPT | Performed by: NURSE PRACTITIONER

## 2021-06-04 ASSESSMENT — ENCOUNTER SYMPTOMS
MYALGIAS: 0
DIARRHEA: 0
ARTHRALGIAS: 0
BREAST MASS: 0
NERVOUS/ANXIOUS: 0
PARESTHESIAS: 0
EYE PAIN: 0
FEVER: 0
CHILLS: 0
HEARTBURN: 0
SORE THROAT: 0
HEADACHES: 0
COUGH: 0
PALPITATIONS: 0
WEAKNESS: 0
DYSURIA: 0
CONSTIPATION: 0
NAUSEA: 0
HEMATURIA: 0
HEMATOCHEZIA: 0
FREQUENCY: 0
SHORTNESS OF BREATH: 0
JOINT SWELLING: 0
DIZZINESS: 0
ABDOMINAL PAIN: 0

## 2021-06-04 ASSESSMENT — MIFFLIN-ST. JEOR: SCORE: 1272.97

## 2021-06-06 NOTE — RESULT ENCOUNTER NOTE
Judson Pena,    Attached are your test results.  -Normal red blood cell (hgb) levels, decreased white blood cell count and decreased platelet levels. ADVISE: will refer to hematology as I can not see why that would be     Please call [phone] to make appointment  if you do not hear from referrals in the next few days.       -Cholesterol levels (LDL,HDL, Triglycerides) are normal.  ADVISE: rechecking in 1 year.   -Liver and gallbladder tests are normal (ALT,AST, Alk phos, bilirubin), kidney function is normal (Cr, GFR), sodium is normal, potassium is normal, calcium is normal, glucose is normal.  -TSH (thyroid stimulating hormone) level is normal which indicates normal thyroid function.   Please contact us if you have any questions.    Chyna Hunter, CNP

## 2021-06-08 LAB
COPATH REPORT: NORMAL
COPATH REPORT: NORMAL
PAP: NORMAL

## 2021-06-10 NOTE — RESULT ENCOUNTER NOTE
Judson Pena,    Attached are your test results.  Blood confirms low White blood cell and platelets   Please make appointment with hematology to look at this closer    Please call [phone] to make appointment  if you do not hear from referrals in the next few days.      Please contact us if you have any questions.    Cyhna Hunter, CNP

## 2021-06-11 LAB
FINAL DIAGNOSIS: NORMAL
HPV HR 12 DNA CVX QL NAA+PROBE: NEGATIVE
HPV16 DNA SPEC QL NAA+PROBE: NEGATIVE
HPV18 DNA SPEC QL NAA+PROBE: NEGATIVE
SPECIMEN DESCRIPTION: NORMAL
SPECIMEN SOURCE CVX/VAG CYTO: NORMAL

## 2021-07-14 ENCOUNTER — ANCILLARY PROCEDURE (OUTPATIENT)
Dept: BONE DENSITY | Facility: CLINIC | Age: 56
End: 2021-07-14
Attending: NURSE PRACTITIONER
Payer: COMMERCIAL

## 2021-07-14 ENCOUNTER — ANCILLARY PROCEDURE (OUTPATIENT)
Dept: MAMMOGRAPHY | Facility: CLINIC | Age: 56
End: 2021-07-14
Attending: NURSE PRACTITIONER
Payer: COMMERCIAL

## 2021-07-14 DIAGNOSIS — E28.39 MENOPAUSE OVARIAN FAILURE: ICD-10-CM

## 2021-07-14 DIAGNOSIS — Z12.31 ENCOUNTER FOR SCREENING MAMMOGRAM FOR BREAST CANCER: ICD-10-CM

## 2021-07-14 PROCEDURE — 77063 BREAST TOMOSYNTHESIS BI: CPT | Mod: GC | Performed by: STUDENT IN AN ORGANIZED HEALTH CARE EDUCATION/TRAINING PROGRAM

## 2021-07-14 PROCEDURE — 77081 DXA BONE DENSITY APPENDICULR: CPT | Mod: 59 | Performed by: RADIOLOGY

## 2021-07-14 PROCEDURE — 77080 DXA BONE DENSITY AXIAL: CPT | Performed by: RADIOLOGY

## 2021-07-14 PROCEDURE — 77067 SCR MAMMO BI INCL CAD: CPT | Mod: GC | Performed by: STUDENT IN AN ORGANIZED HEALTH CARE EDUCATION/TRAINING PROGRAM

## 2021-07-14 NOTE — RESULT ENCOUNTER NOTE
Judson Pena,    Attached are your test results.  The bone density test shows osteopenia. This is an intermediate category that is in between normal and osteoporosis.  People with osteopenia should work on taking in 0978-5452 mg of calcium with vitamin D daily. They should also be getting daily weight bearing exercise (walking works)     Please contact us if you have any questions.    Chyna Hunter, CNP

## 2021-07-26 ENCOUNTER — TELEPHONE (OUTPATIENT)
Dept: FAMILY MEDICINE | Facility: CLINIC | Age: 56
End: 2021-07-26

## 2021-07-26 NOTE — TELEPHONE ENCOUNTER
Patient Quality Outreach      Summary:    Patient has the following on her problem list/HM: None    Patient is due/failing the following:   Colonoscopy    Type of outreach:    Sent [facility] message.    Questions for provider review:    None                                                                                                                                     Faustina Hoang

## 2021-09-04 ENCOUNTER — HEALTH MAINTENANCE LETTER (OUTPATIENT)
Age: 56
End: 2021-09-04

## 2022-08-06 ENCOUNTER — HEALTH MAINTENANCE LETTER (OUTPATIENT)
Age: 57
End: 2022-08-06

## 2022-10-22 ENCOUNTER — HEALTH MAINTENANCE LETTER (OUTPATIENT)
Age: 57
End: 2022-10-22

## 2023-05-02 ENCOUNTER — E-VISIT (OUTPATIENT)
Dept: FAMILY MEDICINE | Facility: CLINIC | Age: 58
End: 2023-05-02

## 2023-05-02 ENCOUNTER — MYC MEDICAL ADVICE (OUTPATIENT)
Dept: FAMILY MEDICINE | Facility: CLINIC | Age: 58
End: 2023-05-02

## 2023-05-02 ENCOUNTER — LAB (OUTPATIENT)
Dept: LAB | Facility: CLINIC | Age: 58
End: 2023-05-02
Payer: COMMERCIAL

## 2023-05-02 DIAGNOSIS — N89.8 VAGINAL DISCHARGE: Primary | ICD-10-CM

## 2023-05-02 DIAGNOSIS — B37.31 CANDIDAL VULVOVAGINITIS: ICD-10-CM

## 2023-05-02 DIAGNOSIS — N89.8 VAGINAL DISCHARGE: ICD-10-CM

## 2023-05-02 DIAGNOSIS — B37.31 YEAST VAGINITIS: ICD-10-CM

## 2023-05-02 LAB
CLUE CELLS: ABNORMAL
TRICHOMONAS, WET PREP: ABNORMAL
WBC'S/HIGH POWER FIELD, WET PREP: ABNORMAL
YEAST, WET PREP: PRESENT

## 2023-05-02 PROCEDURE — 87210 SMEAR WET MOUNT SALINE/INK: CPT

## 2023-05-02 PROCEDURE — 99421 OL DIG E/M SVC 5-10 MIN: CPT | Performed by: NURSE PRACTITIONER

## 2023-05-02 RX ORDER — FLUCONAZOLE 150 MG/1
150 TABLET ORAL ONCE
Qty: 1 TABLET | Refills: 0 | Status: SHIPPED | OUTPATIENT
Start: 2023-05-02 | End: 2023-05-02

## 2023-05-24 ENCOUNTER — OFFICE VISIT (OUTPATIENT)
Dept: OBGYN | Facility: CLINIC | Age: 58
End: 2023-05-24
Payer: COMMERCIAL

## 2023-05-24 VITALS
HEIGHT: 64 IN | DIASTOLIC BLOOD PRESSURE: 72 MMHG | WEIGHT: 160.8 LBS | HEART RATE: 61 BPM | BODY MASS INDEX: 27.45 KG/M2 | SYSTOLIC BLOOD PRESSURE: 109 MMHG | OXYGEN SATURATION: 99 %

## 2023-05-24 DIAGNOSIS — N81.4 UTERINE PROLAPSE: Primary | ICD-10-CM

## 2023-05-24 PROCEDURE — 99204 OFFICE O/P NEW MOD 45 MIN: CPT | Performed by: OBSTETRICS & GYNECOLOGY

## 2023-05-24 NOTE — PATIENT INSTRUCTIONS
If you have any questions regarding your visit, Please contact your care team.     [facility]: [phone]    To Schedule an Appointment 24/7  Call: [phone] HOURS TELEPHONE NUMBER     Shaheen Gardiner MD  Medical Director    Deborah Chanel-ED Beal-Surgery Scheduler  Cari-Surgery Scheduler               Tuesday-[place]  7:30 a.m-4:30 p.m    Thursday-[place]  7:30 a.m-4:30 p.m    Typical Surgery Days: Tuesday or Friday [facility] [place]  [address] [place] [address]  PH: [phone]     Imaging Scheduling all locations  PH: [phone]     [facility] and Delivery  [address] [place] [address] [place] [address]  PH: [phone]    [facility]  [address] [place] [address]  PH: [phone] Fax      **Surgeries** Our Surgery Schedulers will contact you to schedule. If you do not receive a call within 3 business days, please call [phone].    Urgent Care locations:  [facility] Monday-Friday  10 am - 8 pm  Saturday and Sunday   9 am - 5 pm  Monday-Friday   10 am - 8 pm  Saturday and Sunday   9 am - 5 pm   [phone]   If you need a medication refill, please contact your pharmacy. Please allow 3 business days for your refill to be completed.  As always, Thank you for trusting us with your healthcare needs!    see additional instructions from your care team below

## 2023-05-24 NOTE — PROGRESS NOTES
"Libra is a 57 year old   is here today complaining of pelvic pressure.  This has been a problem for a couple years.  She denies bladder or bowel incontinence.  She is still very active and also still sexually active..       ROS: Pertinent ROS as above.    Gyn Hx:      Past Medical History:   Diagnosis Date     Arthritis 2012    osteo arthritis in hands     Knee pain      Leukopenia      Other neutropenia (H)      Past Surgical History:   Procedure Laterality Date     ABDOMEN SURGERY      apendectomy     APPENDECTOMY           ALL/Meds: Her medication and allergy histories were reviewed and are documented in their appropriate chart areas.    SH: Reviewed and documented in the appropriate area of the chart.  FH:  Her family history is reviewed and updated in the chart, today.  PMH: Her past medical, surgical, and obstetric histories were reviewed and updated today in the appropriate chart areas.    PE: /72 (BP Location: Right arm, Patient Position: Sitting, Cuff Size: Adult Regular)   Pulse 61   Ht 1.619 m (5' 3.74\")   Wt 72.9 kg (160 lb 12.8 oz)   LMP 2011   SpO2 99%   BMI 27.83 kg/m    Body mass index is 27.83 kg/m .    Pertinent Physical exam findings:    General Appearance:  healthy, alert, active, no distress  Abdomen: Benign, Soft, flat, non-tender, No masses, organomegaly, No inguinal nodes and Bowel sounds normoactive.   Pelvic:       - Ext: Vulva and perineum are normal without lesion, mass or discharge , the urogenital hiatus is widened.       - Urethra: normal without discharge or scarring minimal hypermobility       - Bladder: no tenderness, no masses       - Vagina:  without discharge, rugated, mild cystcele, minimal  rectocele and proofound uterine descensus down to and through the introitus with valsalva       - Cervix: normal       - Uterus:Normal shape, position and consistency and Nontender       - Adnexa: Normal without masses or tenderness    I discussed pelvic support " and how it can be damaged.  We discussed the association of the pelvic organs and how they are attached.  We discussed treatment options including kegel exercises, physical therapy and surgical repair of her particular issues.        A/P:(N81.4) Uterine prolapse  (primary encounter diagnosis)  Comment: 45 minutes spent on the date of the encounter doing chart review, patient visit and documentation   Plan: Physical Therapy Referral                   -   Orders Placed This Encounter   Procedures     Physical Therapy Referral

## 2023-06-07 ENCOUNTER — ANCILLARY PROCEDURE (OUTPATIENT)
Dept: MAMMOGRAPHY | Facility: CLINIC | Age: 58
End: 2023-06-07
Attending: INTERNAL MEDICINE
Payer: COMMERCIAL

## 2023-06-07 DIAGNOSIS — Z12.31 VISIT FOR SCREENING MAMMOGRAM: ICD-10-CM

## 2023-06-07 PROCEDURE — 77063 BREAST TOMOSYNTHESIS BI: CPT | Mod: GC | Performed by: RADIOLOGY

## 2023-06-07 PROCEDURE — 77067 SCR MAMMO BI INCL CAD: CPT | Mod: GC | Performed by: RADIOLOGY

## 2023-06-29 ENCOUNTER — OFFICE VISIT (OUTPATIENT)
Dept: FAMILY MEDICINE | Facility: CLINIC | Age: 58
End: 2023-06-29
Payer: COMMERCIAL

## 2023-06-29 ENCOUNTER — LAB (OUTPATIENT)
Dept: FAMILY MEDICINE | Facility: CLINIC | Age: 58
End: 2023-06-29

## 2023-06-29 VITALS
TEMPERATURE: 97.8 F | HEIGHT: 65 IN | HEART RATE: 57 BPM | SYSTOLIC BLOOD PRESSURE: 107 MMHG | DIASTOLIC BLOOD PRESSURE: 69 MMHG | BODY MASS INDEX: 26.76 KG/M2 | WEIGHT: 160.6 LBS | RESPIRATION RATE: 12 BRPM | OXYGEN SATURATION: 99 %

## 2023-06-29 DIAGNOSIS — Z12.11 SCREEN FOR COLON CANCER: ICD-10-CM

## 2023-06-29 DIAGNOSIS — Z12.31 ENCOUNTER FOR SCREENING MAMMOGRAM FOR BREAST CANCER: ICD-10-CM

## 2023-06-29 DIAGNOSIS — Z13.0 SCREENING FOR DISORDER OF BLOOD AND BLOOD-FORMING ORGANS: ICD-10-CM

## 2023-06-29 DIAGNOSIS — Z13.1 SCREENING FOR DIABETES MELLITUS (DM): ICD-10-CM

## 2023-06-29 DIAGNOSIS — Z13.6 CARDIOVASCULAR SCREENING; LDL GOAL LESS THAN 160: ICD-10-CM

## 2023-06-29 DIAGNOSIS — Z00.00 ROUTINE GENERAL MEDICAL EXAMINATION AT A HEALTH CARE FACILITY: Primary | ICD-10-CM

## 2023-06-29 DIAGNOSIS — Z13.29 SCREENING FOR THYROID DISORDER: ICD-10-CM

## 2023-06-29 LAB
ALBUMIN SERPL BCG-MCNC: 4.6 G/DL (ref 3.5–5.2)
ALP SERPL-CCNC: 93 U/L (ref 35–104)
ALT SERPL W P-5'-P-CCNC: 19 U/L (ref 0–50)
ANION GAP SERPL CALCULATED.3IONS-SCNC: 11 MMOL/L (ref 7–15)
AST SERPL W P-5'-P-CCNC: 21 U/L (ref 0–45)
BILIRUB SERPL-MCNC: 0.4 MG/DL
BUN SERPL-MCNC: 13 MG/DL (ref 6–20)
CALCIUM SERPL-MCNC: 9.6 MG/DL (ref 8.6–10)
CHLORIDE SERPL-SCNC: 103 MMOL/L (ref 98–107)
CHOLEST SERPL-MCNC: 179 MG/DL
CREAT SERPL-MCNC: 0.79 MG/DL (ref 0.51–0.95)
DEPRECATED HCO3 PLAS-SCNC: 26 MMOL/L (ref 22–29)
ERYTHROCYTE [DISTWIDTH] IN BLOOD BY AUTOMATED COUNT: 13.5 % (ref 10–15)
GFR SERPL CREATININE-BSD FRML MDRD: 86 ML/MIN/1.73M2
GLUCOSE SERPL-MCNC: 94 MG/DL (ref 70–99)
HCT VFR BLD AUTO: 42 % (ref 35–47)
HDLC SERPL-MCNC: 66 MG/DL
HGB BLD-MCNC: 13.6 G/DL (ref 11.7–15.7)
LDLC SERPL CALC-MCNC: 98 MG/DL
MCH RBC QN AUTO: 29.1 PG (ref 26.5–33)
MCHC RBC AUTO-ENTMCNC: 32.4 G/DL (ref 31.5–36.5)
MCV RBC AUTO: 90 FL (ref 78–100)
NONHDLC SERPL-MCNC: 113 MG/DL
PLATELET # BLD AUTO: 174 10E3/UL (ref 150–450)
POTASSIUM SERPL-SCNC: 4 MMOL/L (ref 3.4–5.3)
PROT SERPL-MCNC: 7.5 G/DL (ref 6.4–8.3)
RBC # BLD AUTO: 4.68 10E6/UL (ref 3.8–5.2)
SODIUM SERPL-SCNC: 140 MMOL/L (ref 136–145)
TRIGL SERPL-MCNC: 77 MG/DL
TSH SERPL DL<=0.005 MIU/L-ACNC: 3.1 UIU/ML (ref 0.3–4.2)
WBC # BLD AUTO: 3.8 10E3/UL (ref 4–11)

## 2023-06-29 PROCEDURE — 85027 COMPLETE CBC AUTOMATED: CPT | Performed by: NURSE PRACTITIONER

## 2023-06-29 PROCEDURE — 80053 COMPREHEN METABOLIC PANEL: CPT | Performed by: NURSE PRACTITIONER

## 2023-06-29 PROCEDURE — 36415 COLL VENOUS BLD VENIPUNCTURE: CPT | Performed by: NURSE PRACTITIONER

## 2023-06-29 PROCEDURE — 99396 PREV VISIT EST AGE 40-64: CPT | Performed by: NURSE PRACTITIONER

## 2023-06-29 PROCEDURE — 84443 ASSAY THYROID STIM HORMONE: CPT | Performed by: NURSE PRACTITIONER

## 2023-06-29 PROCEDURE — 80061 LIPID PANEL: CPT | Performed by: NURSE PRACTITIONER

## 2023-06-29 ASSESSMENT — ENCOUNTER SYMPTOMS
HEMATURIA: 0
ABDOMINAL PAIN: 0
HEADACHES: 0
BREAST MASS: 0
ARTHRALGIAS: 0
JOINT SWELLING: 0
HEMATOCHEZIA: 0
PALPITATIONS: 0
EYE PAIN: 0
COUGH: 0
CHILLS: 0
WEAKNESS: 0
NERVOUS/ANXIOUS: 0
CONSTIPATION: 0
MYALGIAS: 0
FEVER: 0
HEARTBURN: 0
SHORTNESS OF BREATH: 0
DIZZINESS: 0
FREQUENCY: 0
DIARRHEA: 0
PARESTHESIAS: 0
SORE THROAT: 0
DYSURIA: 0
NAUSEA: 0

## 2023-06-29 ASSESSMENT — PAIN SCALES - GENERAL: PAINLEVEL: NO PAIN (0)

## 2023-06-29 NOTE — PROGRESS NOTES
SUBJECTIVE:   CC: Libra is an 57 year old who presents for preventive health visit.        No data to display              Healthy Habits:     Getting at least 3 servings of Calcium per day:  Yes    Bi-annual eye exam:  Yes    Dental care twice a year:  Yes    Sleep apnea or symptoms of sleep apnea:  None    Diet:  Regular (no restrictions)    Frequency of exercise:  2-3 days/week    Duration of exercise:  15-30 minutes    Taking medications regularly:  Yes    PHQ-2 Total Score: 0    Additional concerns today:  No      Today's PHQ-2 Score:       6/29/2023     6:34 AM   PHQ-2 ( 1999 [employer])   Q1: Little interest or pleasure in doing things 0   Q2: Feeling down, depressed or hopeless 0   PHQ-2 Score 0   Q1: Little interest or pleasure in doing things Not at all   Q2: Feeling down, depressed or hopeless Not at all   PHQ-2 Score 0       Social History     Tobacco Use     Smoking status: Never     Smokeless tobacco: Never   Substance Use Topics     Alcohol use: Yes     Comment: occassionally              6/29/2023     6:34 AM   Alcohol Use   Prescreen: >3 drinks/day or >7 drinks/week? No     Reviewed orders with patient.  Reviewed health maintenance and updated orders accordingly - Yes  Lab work is in process  Labs reviewed in EPIC  BP Readings from Last 3 Encounters:   06/29/23 107/69   05/24/23 109/72   06/04/21 130/64    Wt Readings from Last 3 Encounters:   06/29/23 72.8 kg (160 lb 9.6 oz)   05/24/23 72.9 kg (160 lb 12.8 oz)   06/04/21 69.4 kg (153 lb)                  Patient Active Problem List   Diagnosis     Herpes simplex vulvovaginitis     CARDIOVASCULAR SCREENING; LDL GOAL LESS THAN 160     Ganglion cyst     Family history of coronary artery disease     Overweight (BMI 25.0-29.9)     Other neutropenia (H)     Acute pain of left knee     Knee effusion, left     Uterine prolapse     Past Surgical History:   Procedure Laterality Date     ABDOMEN SURGERY  1987    apendectomy     APPENDECTOMY         Social  History     Tobacco Use     Smoking status: Never     Smokeless tobacco: Never   Substance Use Topics     Alcohol use: Yes     Comment: occassionally      Family History   Problem Relation Age of Onset     Hypertension Mother         and brother     Cerebrovascular Disease Mother      Arthritis Father      C.A.D. Brother 50        CABG at age 50     Diabetes Brother      Depression Sister      Asthma Sister          No current outpatient medications on file.     No Known Allergies    Breast Cancer Screenin/4/2021     7:16 AM   Breast CA Risk Assessment (FHS-7)   Do you have a family history of breast, colon, or ovarian cancer? No / Unknown     Mammogram Screening: Recommended mammography every 1-2 years with patient discussion and risk factor consideration  Pertinent mammograms are reviewed under the imaging tab.    History of abnormal Pap smear: NO - age 30-65 PAP every 5 years with negative HPV co-testing recommended      Latest Ref Rng & Units 2021     8:00 AM 2021     7:48 AM 2015     9:20 AM   PAP / HPV   PAP (Historical)   NIL     HPV 16 DNA NEG^Negative Negative   Negative    HPV 18 DNA NEG^Negative Negative   Negative    Other HR HPV NEG^Negative Negative   Negative      Reviewed and updated as needed this visit by clinical staff   Tobacco  Allergies  Meds              Reviewed and updated as needed this visit by Provider                 Past Medical History:   Diagnosis Date     Arthritis 2012    osteo arthritis in hands     Knee pain      Leukopenia      Other neutropenia (H)       Past Surgical History:   Procedure Laterality Date     ABDOMEN SURGERY      apendectomy     APPENDECTOMY         Review of Systems   Constitutional: Negative for chills and fever.   HENT: Negative for congestion, ear pain, hearing loss and sore throat.    Eyes: Negative for pain and visual disturbance.   Respiratory: Negative for cough and shortness of breath.    Cardiovascular: Negative for chest  "pain, palpitations and peripheral edema.   Gastrointestinal: Negative for abdominal pain, constipation, diarrhea, heartburn, hematochezia and nausea.   Breasts:  Negative for tenderness, breast mass and discharge.   Genitourinary: Negative for dysuria, frequency, genital sores, hematuria, pelvic pain, urgency, vaginal bleeding and vaginal discharge.   Musculoskeletal: Negative for arthralgias, joint swelling and myalgias.   Skin: Negative for rash.   Neurological: Negative for dizziness, weakness, headaches and paresthesias.   Psychiatric/Behavioral: Negative for mood changes. The patient is not nervous/anxious.         OBJECTIVE:   /69 (BP Location: Right arm, Patient Position: Sitting, Cuff Size: Adult Large)   Pulse 57   Temp 97.8  F (36.6  C) (Oral)   Resp 12   Ht 1.638 m (5' 4.5\")   Wt 72.8 kg (160 lb 9.6 oz)   LMP 12/16/2011   SpO2 99%   BMI 27.14 kg/m    Physical Exam  GENERAL APPEARANCE: healthy, alert and no distress  EYES: Eyes grossly normal to inspection and conjunctivae and sclerae normal  HENT: ear canals and TM's normal, nose and mouth without ulcers or lesions, oropharynx clear and oral mucous membranes moist  NECK: no adenopathy, no asymmetry, masses, or scars and thyroid normal to palpation  RESP: lungs clear to auscultation - no rales, rhonchi or wheezes  BREAST: normal without masses, tenderness or nipple discharge and no palpable axillary masses or adenopathy  CV: regular rates and rhythm, no murmur, click or rub, no peripheral edema and peripheral pulses strong  ABDOMEN: soft, nontender, no hepatosplenomegaly, no masses and bowel sounds normal   (female): normal female external genitalia, normal urethral meatus, vaginal mucosal atrophy noted, normal cervix, adnexae, and uterus without masses or abnormal discharge  MS: no musculoskeletal defects are noted and gait is age appropriate without ataxia  SKIN: no suspicious lesions or rashes  NEURO: Normal strength and tone, sensory " exam grossly normal, mentation intact and speech normal  PSYCH: mentation appears normal and affect normal/bright    Diagnostic Test Results:  Labs reviewed in Epic  Results for orders placed or performed in visit on 06/29/23   Lipid panel reflex to direct LDL Fasting     Status: Normal   Result Value Ref Range    Cholesterol 179 <200 mg/dL    Triglycerides 77 <150 mg/dL    Direct Measure HDL 66 >=50 mg/dL    LDL Cholesterol Calculated 98 <=100 mg/dL    Non HDL Cholesterol 113 <130 mg/dL    Narrative    Cholesterol  Desirable:  <200 mg/dL    Triglycerides  Normal:  Less than 150 mg/dL  Borderline High:  150-199 mg/dL  High:  200-499 mg/dL  Very High:  Greater than or equal to 500 mg/dL    Direct Measure HDL  Female:  Greater than or equal to 50 mg/dL   Male:  Greater than or equal to 40 mg/dL    LDL Cholesterol  Desirable:  <100mg/dL  Above Desirable:  100-129 mg/dL   Borderline High:  130-159 mg/dL   High:  160-189 mg/dL   Very High:  >= 190 mg/dL    Non HDL Cholesterol  Desirable:  130 mg/dL  Above Desirable:  130-159 mg/dL  Borderline High:  160-189 mg/dL  High:  190-219 mg/dL  Very High:  Greater than or equal to 220 mg/dL   CBC with platelets     Status: Abnormal   Result Value Ref Range    WBC Count 3.8 (L) 4.0 - 11.0 10e3/uL    RBC Count 4.68 3.80 - 5.20 10e6/uL    Hemoglobin 13.6 11.7 - 15.7 g/dL    Hematocrit 42.0 35.0 - 47.0 %    MCV 90 78 - 100 fL    MCH 29.1 26.5 - 33.0 pg    MCHC 32.4 31.5 - 36.5 g/dL    RDW 13.5 10.0 - 15.0 %    Platelet Count 174 150 - 450 10e3/uL   Comprehensive metabolic panel     Status: Normal   Result Value Ref Range    Sodium 140 136 - 145 mmol/L    Potassium 4.0 3.4 - 5.3 mmol/L    Chloride 103 98 - 107 mmol/L    Carbon Dioxide (CO2) 26 22 - 29 mmol/L    Anion Gap 11 7 - 15 mmol/L    Urea Nitrogen 13.0 6.0 - 20.0 mg/dL    Creatinine 0.79 0.51 - 0.95 mg/dL    Calcium 9.6 8.6 - 10.0 mg/dL    Glucose 94 70 - 99 mg/dL    Alkaline Phosphatase 93 35 - 104 U/L    AST 21 0 - 45 U/L     ALT 19 0 - 50 U/L    Protein Total 7.5 6.4 - 8.3 g/dL    Albumin 4.6 3.5 - 5.2 g/dL    Bilirubin Total 0.4 <=1.2 mg/dL    GFR Estimate 86 >60 mL/min/1.73m2   TSH with free T4 reflex     Status: Normal   Result Value Ref Range    TSH 3.10 0.30 - 4.20 uIU/mL       ASSESSMENT/PLAN:   Libra was seen today for physical.    Diagnoses and all orders for this visit:    Screen for colon cancer  -     COLOGUARD([employer]); Future    Routine general medical examination at a health care facility  -     REVIEW OF HEALTH MAINTENANCE PROTOCOL ORDERS    CARDIOVASCULAR SCREENING; LDL GOAL LESS THAN 160  -     Lipid panel reflex to direct LDL Fasting; Future    Screening for diabetes mellitus (DM)  -     Comprehensive metabolic panel; Future    Encounter for screening mammogram for breast cancer  Up to date    Screening for disorder of blood and blood-forming organs  -     CBC with platelets; Future    Screening for thyroid disorder  -     TSH with free T4 reflex; Future    PLAN:   Patient needs to follow up in if no improvement,or sooner if worsening of symptoms or other symptoms develop.  Will follow up and/or notify patient of  results via My Chart to determine further need for followup  Follow up office visit in one year for annual health maintenance exam, sooner PRN.    Patient has been advised of split billing requirements and indicates understanding: Yes      COUNSELING:  Reviewed preventive health counseling, as reflected in patient instructions  Special attention given to:        Regular exercise       Healthy diet/nutrition       Vision screening       Osteoporosis prevention/bone health       Colorectal Cancer Screening       Syphilis screening for high risk patients        The 10-year ASCVD risk score (Marge SMITH, et al., 2019) is: 1.5%    Values used to calculate the score:      Age: 57 years      Sex: Female      Is Non- : No      Diabetic: No      Tobacco smoker: No      Systolic Blood  "Pressure: 107 mmHg      Is BP treated: No      HDL Cholesterol: 66 mg/dL      Total Cholesterol: 179 mg/dL       Advance Care Planning      BMI:   Estimated body mass index is 27.14 kg/m  as calculated from the following:    Height as of this encounter: 1.638 m (5' 4.5\").    Weight as of this encounter: 72.8 kg (160 lb 9.6 oz).   Weight management plan: Discussed healthy diet and exercise guidelines      She reports that she has never smoked. She has never used smokeless tobacco.          BENNIE Castillo [facility]  "

## 2023-06-29 NOTE — PATIENT INSTRUCTIONS
PLAN:   1.   Symptomatic therapy suggested: Increase calcium to 1000mg and 1000 iu Vit D  2.  Orders Placed This Encounter   Procedures    REVIEW OF HEALTH MAINTENANCE PROTOCOL ORDERS    Lipid panel reflex to direct LDL Fasting    CBC with platelets    Comprehensive metabolic panel    TSH with free T4 reflex     3. Patient needs to follow up in if no improvement,or sooner if worsening of symptoms or other symptoms develop.  Will follow up and/or notify patient of  results via My Chart to determine further need for followup  Follow up office visit in one year for annual health maintenance exam, sooner PRN.    Preventive Health Recommendations  Female Ages 50 - 64    Yearly exam: See your health care provider every year in order to  Review health changes.   Discuss preventive care.    Review your medicines if your doctor has prescribed any.    Get a Pap test every three years (unless you have an abnormal result and your provider advises testing more often).  If you get Pap tests with HPV test, you only need to test every 5 years, unless you have an abnormal result.   You do not need a Pap test if your uterus was removed (hysterectomy) and you have not had cancer.  You should be tested each year for STDs (sexually transmitted diseases) if you're at risk.   Have a mammogram every 1 to 2 years.  Have a colonoscopy at age 50, or have a yearly FIT test (stool test). These exams screen for colon cancer.    Have a cholesterol test every 5 years, or more often if advised.  Have a diabetes test (fasting glucose) every three years. If you are at risk for diabetes, you should have this test more often.   If you are at risk for osteoporosis (brittle bone disease), think about having a bone density scan (DEXA).    Shots: Get a flu shot each year. Get a tetanus shot every 10 years.    Nutrition:   Eat at least 5 servings of fruits and vegetables each day.  Eat whole-grain bread, whole-wheat pasta and brown rice instead of white  grains and rice.  Get adequate Calcium and Vitamin D.     Lifestyle  Exercise at least 150 minutes a week (30 minutes a day, 5 days a week). This will help you control your weight and prevent disease.  Limit alcohol to one drink per day.  No smoking.   Wear sunscreen to prevent skin cancer.   See your dentist every six months for an exam and cleaning.  See your eye doctor every 1 to 2 years.

## 2023-06-30 NOTE — RESULT ENCOUNTER NOTE
Judson Pena,    Attached are your test results.  -Normal red blood cell (hgb) levels, decreased white blood cell count and normal platelet levels. ADVISE: very slight not clinically significant as rest is normal   -Cholesterol levels (LDL,HDL, Triglycerides) are normal.  ADVISE: rechecking in 1 year.   -Liver and gallbladder tests are normal (ALT,AST, Alk phos, bilirubin), kidney function is normal (Cr, GFR), sodium is normal, potassium is normal, calcium is normal, glucose is normal.  -TSH (thyroid stimulating hormone) level is normal which indicates normal thyroid function.   Please contact us if you have any questions.    Chyna Hunter, CNP

## 2023-07-21 LAB — NONINV COLON CA DNA+OCC BLD SCRN STL QL: NEGATIVE

## 2023-07-22 NOTE — RESULT ENCOUNTER NOTE
Judson Pena,    Attached are your test results.  -Cologuard  test (screening test for colon cancer) was normal. ADVISE: rechecking this test in 3 year.   Please contact us if you have any questions.    Chyna Hunter, CNP

## 2023-11-20 ENCOUNTER — MYC MEDICAL ADVICE (OUTPATIENT)
Dept: FAMILY MEDICINE | Facility: CLINIC | Age: 58
End: 2023-11-20
Payer: COMMERCIAL

## 2024-05-08 ENCOUNTER — PATIENT OUTREACH (OUTPATIENT)
Dept: CARE COORDINATION | Facility: CLINIC | Age: 59
End: 2024-05-08
Payer: COMMERCIAL

## 2024-05-30 ENCOUNTER — PATIENT OUTREACH (OUTPATIENT)
Dept: CARE COORDINATION | Facility: CLINIC | Age: 59
End: 2024-05-30
Payer: COMMERCIAL

## 2024-06-05 ENCOUNTER — PATIENT OUTREACH (OUTPATIENT)
Dept: CARE COORDINATION | Facility: CLINIC | Age: 59
End: 2024-06-05
Payer: COMMERCIAL

## 2024-06-13 ENCOUNTER — PATIENT OUTREACH (OUTPATIENT)
Dept: CARE COORDINATION | Facility: CLINIC | Age: 59
End: 2024-06-13
Payer: COMMERCIAL

## 2024-08-11 ENCOUNTER — HEALTH MAINTENANCE LETTER (OUTPATIENT)
Age: 59
End: 2024-08-11

## 2024-09-25 ENCOUNTER — ANCILLARY PROCEDURE (OUTPATIENT)
Dept: MAMMOGRAPHY | Facility: CLINIC | Age: 59
End: 2024-09-25
Attending: INTERNAL MEDICINE
Payer: COMMERCIAL

## 2024-09-25 DIAGNOSIS — Z12.31 VISIT FOR SCREENING MAMMOGRAM: ICD-10-CM

## 2024-09-25 PROCEDURE — 77063 BREAST TOMOSYNTHESIS BI: CPT | Mod: GC

## 2024-09-25 PROCEDURE — 77067 SCR MAMMO BI INCL CAD: CPT | Mod: GC

## 2024-10-08 ENCOUNTER — PATIENT OUTREACH (OUTPATIENT)
Dept: CARE COORDINATION | Facility: CLINIC | Age: 59
End: 2024-10-08
Payer: COMMERCIAL

## 2024-10-19 SDOH — HEALTH STABILITY: PHYSICAL HEALTH: ON AVERAGE, HOW MANY DAYS PER WEEK DO YOU ENGAGE IN MODERATE TO STRENUOUS EXERCISE (LIKE A BRISK WALK)?: 4 DAYS

## 2024-10-19 SDOH — HEALTH STABILITY: PHYSICAL HEALTH: ON AVERAGE, HOW MANY MINUTES DO YOU ENGAGE IN EXERCISE AT THIS LEVEL?: 20 MIN

## 2024-10-19 ASSESSMENT — SOCIAL DETERMINANTS OF HEALTH (SDOH): HOW OFTEN DO YOU GET TOGETHER WITH FRIENDS OR RELATIVES?: ONCE A WEEK

## 2024-10-24 ENCOUNTER — OFFICE VISIT (OUTPATIENT)
Dept: FAMILY MEDICINE | Facility: CLINIC | Age: 59
End: 2024-10-24
Payer: COMMERCIAL

## 2024-10-24 VITALS
SYSTOLIC BLOOD PRESSURE: 106 MMHG | DIASTOLIC BLOOD PRESSURE: 70 MMHG | HEIGHT: 64 IN | HEART RATE: 72 BPM | WEIGHT: 168.25 LBS | OXYGEN SATURATION: 100 % | RESPIRATION RATE: 14 BRPM | BODY MASS INDEX: 28.73 KG/M2 | TEMPERATURE: 96.8 F

## 2024-10-24 DIAGNOSIS — Z13.1 SCREENING FOR DIABETES MELLITUS (DM): ICD-10-CM

## 2024-10-24 DIAGNOSIS — N81.4 UTERINE PROLAPSE: ICD-10-CM

## 2024-10-24 DIAGNOSIS — Z13.6 CARDIOVASCULAR SCREENING; LDL GOAL LESS THAN 160: ICD-10-CM

## 2024-10-24 DIAGNOSIS — Z13.29 SCREENING FOR THYROID DISORDER: ICD-10-CM

## 2024-10-24 DIAGNOSIS — E28.39 MENOPAUSE OVARIAN FAILURE: ICD-10-CM

## 2024-10-24 DIAGNOSIS — Z00.00 ROUTINE GENERAL MEDICAL EXAMINATION AT A HEALTH CARE FACILITY: Primary | ICD-10-CM

## 2024-10-24 DIAGNOSIS — Z13.0 SCREENING FOR DISORDER OF BLOOD AND BLOOD-FORMING ORGANS: ICD-10-CM

## 2024-10-24 PROBLEM — M25.562 ACUTE PAIN OF LEFT KNEE: Status: RESOLVED | Noted: 2017-11-09 | Resolved: 2024-10-24

## 2024-10-24 PROBLEM — M35.9 CONNECTIVE TISSUE DISEASE (H): Status: ACTIVE | Noted: 2024-10-24

## 2024-10-24 PROBLEM — R12 HEART BURN: Status: RESOLVED | Noted: 2017-09-21 | Resolved: 2024-10-24

## 2024-10-24 PROBLEM — I83.93 VARICOSE VEINS OF BOTH LOWER EXTREMITIES, UNSPECIFIED WHETHER COMPLICATED: Status: ACTIVE | Noted: 2024-10-24

## 2024-10-24 PROBLEM — D70.8 OTHER NEUTROPENIA (H): Status: RESOLVED | Noted: 2017-11-09 | Resolved: 2024-10-24

## 2024-10-24 PROBLEM — M25.462 KNEE EFFUSION, LEFT: Status: RESOLVED | Noted: 2018-01-12 | Resolved: 2024-10-24

## 2024-10-24 PROBLEM — M35.9 CONNECTIVE TISSUE DISEASE (H): Status: RESOLVED | Noted: 2024-10-24 | Resolved: 2024-10-24

## 2024-10-24 PROBLEM — M25.562 ACUTE PAIN OF LEFT KNEE: Status: RESOLVED | Noted: 2017-09-21 | Resolved: 2024-10-24

## 2024-10-24 LAB
ALBUMIN SERPL BCG-MCNC: 4.3 G/DL (ref 3.5–5.2)
ALP SERPL-CCNC: 105 U/L (ref 40–150)
ALT SERPL W P-5'-P-CCNC: 20 U/L (ref 0–50)
ANION GAP SERPL CALCULATED.3IONS-SCNC: 10 MMOL/L (ref 7–15)
AST SERPL W P-5'-P-CCNC: 22 U/L (ref 0–45)
BILIRUB SERPL-MCNC: 0.3 MG/DL
BUN SERPL-MCNC: 15.8 MG/DL (ref 8–23)
CALCIUM SERPL-MCNC: 9.7 MG/DL (ref 8.8–10.4)
CHLORIDE SERPL-SCNC: 105 MMOL/L (ref 98–107)
CHOLEST SERPL-MCNC: 191 MG/DL
CREAT SERPL-MCNC: 0.78 MG/DL (ref 0.51–0.95)
EGFRCR SERPLBLD CKD-EPI 2021: 86 ML/MIN/1.73M2
ERYTHROCYTE [DISTWIDTH] IN BLOOD BY AUTOMATED COUNT: 13.8 % (ref 10–15)
FASTING STATUS PATIENT QL REPORTED: YES
FASTING STATUS PATIENT QL REPORTED: YES
GLUCOSE SERPL-MCNC: 110 MG/DL (ref 70–99)
HCO3 SERPL-SCNC: 25 MMOL/L (ref 22–29)
HCT VFR BLD AUTO: 40.4 % (ref 35–47)
HDLC SERPL-MCNC: 60 MG/DL
HGB BLD-MCNC: 13 G/DL (ref 11.7–15.7)
LDLC SERPL CALC-MCNC: 117 MG/DL
MCH RBC QN AUTO: 29.3 PG (ref 26.5–33)
MCHC RBC AUTO-ENTMCNC: 32.2 G/DL (ref 31.5–36.5)
MCV RBC AUTO: 91 FL (ref 78–100)
NONHDLC SERPL-MCNC: 131 MG/DL
PLATELET # BLD AUTO: 169 10E3/UL (ref 150–450)
POTASSIUM SERPL-SCNC: 4.4 MMOL/L (ref 3.4–5.3)
PROT SERPL-MCNC: 7.3 G/DL (ref 6.4–8.3)
RBC # BLD AUTO: 4.43 10E6/UL (ref 3.8–5.2)
SODIUM SERPL-SCNC: 140 MMOL/L (ref 135–145)
TRIGL SERPL-MCNC: 72 MG/DL
TSH SERPL DL<=0.005 MIU/L-ACNC: 3.76 UIU/ML (ref 0.3–4.2)
WBC # BLD AUTO: 4.1 10E3/UL (ref 4–11)

## 2024-10-24 PROCEDURE — 85027 COMPLETE CBC AUTOMATED: CPT | Performed by: NURSE PRACTITIONER

## 2024-10-24 PROCEDURE — 84443 ASSAY THYROID STIM HORMONE: CPT | Performed by: NURSE PRACTITIONER

## 2024-10-24 PROCEDURE — 80061 LIPID PANEL: CPT | Performed by: NURSE PRACTITIONER

## 2024-10-24 PROCEDURE — 99213 OFFICE O/P EST LOW 20 MIN: CPT | Mod: 25 | Performed by: NURSE PRACTITIONER

## 2024-10-24 PROCEDURE — 99396 PREV VISIT EST AGE 40-64: CPT | Performed by: NURSE PRACTITIONER

## 2024-10-24 PROCEDURE — 36415 COLL VENOUS BLD VENIPUNCTURE: CPT | Performed by: NURSE PRACTITIONER

## 2024-10-24 PROCEDURE — 80053 COMPREHEN METABOLIC PANEL: CPT | Performed by: NURSE PRACTITIONER

## 2024-10-24 ASSESSMENT — PAIN SCALES - GENERAL: PAINLEVEL_OUTOF10: NO PAIN (0)

## 2024-10-24 NOTE — PATIENT INSTRUCTIONS
PLAN:   1.   Symptomatic therapy suggested: Increase calcium to 1000mg and 1000iu Vit D   2.  Orders Placed This Encounter   Procedures    REVIEW OF HEALTH MAINTENANCE PROTOCOL ORDERS    DX Bone Density    Lipid panel reflex to direct LDL Fasting    CBC with platelets    Comprehensive metabolic panel    TSH with free T4 reflex    Physical Therapy  Referral       3.  FURTHER TESTING:       - DXA (bone density) scan  Work on weight loss  Regular exercise  If wants referral for varicose veins let me know   Will follow up and/or notify patient of  results via My Chart to determine further need for followup   Follow up office visit in one year for annual health maintenance exam, sooner PRN.   Patient needs to follow up in if no improvement,or sooner if worsening of symptoms or other symptoms develop.        Patient Education   Preventive Care Advice   This is general advice given by our system to help you stay healthy. However, your care team may have specific advice just for you. Please talk to your care team about your preventive care needs.  Nutrition  Eat 5 or more servings of fruits and vegetables each day.  Try wheat bread, brown rice and whole grain pasta (instead of white bread, rice, and pasta).  Get enough calcium and vitamin D. Check the label on foods and aim for 100% of the RDA (recommended daily allowance).  Lifestyle  Exercise at least 150 minutes each week  (30 minutes a day, 5 days a week).  Do muscle strengthening activities 2 days a week. These help control your weight and prevent disease.  No smoking.  Wear sunscreen to prevent skin cancer.  Have a dental exam and cleaning every 6 months.  Yearly exams  See your health care team every year to talk about:  Any changes in your health.  Any medicines your care team has prescribed.  Preventive care, family planning, and ways to prevent chronic diseases.  Shots (vaccines)   HPV shots (up to age 26), if you've never had them before.  Hepatitis B  shots (up to age 59), if you've never had them before.  COVID-19 shot: Get this shot when it's due.  Flu shot: Get a flu shot every year.  Tetanus shot: Get a tetanus shot every 10 years.  Pneumococcal, hepatitis A, and RSV shots: Ask your care team if you need these based on your risk.  Shingles shot (for age 50 and up)  General health tests  Diabetes screening:  Starting at age 35, Get screened for diabetes at least every 3 years.  If you are younger than age 35, ask your care team if you should be screened for diabetes.  Cholesterol test: At age 39, start having a cholesterol test every 5 years, or more often if advised.  Bone density scan (DEXA): At age 50, ask your care team if you should have this scan for osteoporosis (brittle bones).  Hepatitis C: Get tested at least once in your life.  STIs (sexually transmitted infections)  Before age 24: Ask your care team if you should be screened for STIs.  After age 24: Get screened for STIs if you're at risk. You are at risk for STIs (including HIV) if:  You are sexually active with more than one person.  You don't use condoms every time.  You or a partner was diagnosed with a sexually transmitted infection.  If you are at risk for HIV, ask about PrEP medicine to prevent HIV.  Get tested for HIV at least once in your life, whether you are at risk for HIV or not.  Cancer screening tests  Cervical cancer screening: If you have a cervix, begin getting regular cervical cancer screening tests starting at age 21.  Breast cancer scan (mammogram): If you've ever had breasts, begin having regular mammograms starting at age 40. This is a scan to check for breast cancer.  Colon cancer screening: It is important to start screening for colon cancer at age 45.  Have a colonoscopy test every 10 years (or more often if you're at risk) Or, ask your provider about stool tests like a FIT test every year or Cologuard test every 3 years.  To learn more about your testing options, visit:    .  For help making a decision, visit:   https://bit.ly/ei26731.  Prostate cancer screening test: If you have a prostate, ask your care team if a prostate cancer screening test (PSA) at age 55 is right for you.  Lung cancer screening: If you are a current or former smoker ages 50 to 80, ask your care team if ongoing lung cancer screenings are right for you.  For informational purposes only. Not to replace the advice of your health care provider. Copyright   2023 [employer]. All rights reserved. Clinically reviewed by the [facility] Transitions Program. [employer] 855616 - REV 01/24.

## 2024-10-24 NOTE — RESULT ENCOUNTER NOTE
Judson Pena,    Attached are your test results.  -Normal red blood cell (hgb) levels, normal white blood cell count and normal platelet levels.   Please contact us if you have any questions.    Chyna Hunter, CNP

## 2024-10-24 NOTE — PROGRESS NOTES
"Preventive Care Visit  [facility] MARIUSZ Clemente BENNIE Burton CNP, Family Medicine  Oct 24, 2024      Assessment & Plan     Routine general medical examination at a health care facility  - REVIEW OF HEALTH MAINTENANCE PROTOCOL ORDERS    CARDIOVASCULAR SCREENING; LDL GOAL LESS THAN 160  - Lipid panel reflex to direct LDL Fasting    Screening for diabetes mellitus (DM)  - Comprehensive metabolic panel    Screening for disorder of blood and blood-forming organs  - CBC with platelets    Screening for thyroid disorder  - TSH with free T4 reflex    Uterine prolapse  Will refer to help with this. Has already seen GYN and recommended this but was not sure she wanted to do that but would like to try   - Physical Therapy  Referral    Menopause ovarian failure  - DX Bone Density      Patient has been advised of split billing requirements and indicates understanding: Yes        BMI  Estimated body mass index is 29.11 kg/m  as calculated from the following:    Height as of this encounter: 1.619 m (5' 3.75\").    Weight as of this encounter: 76.3 kg (168 lb 4 oz).   Weight management plan: Discussed healthy diet and exercise guidelines    Counseling  Appropriate preventive services were addressed with this patient via screening, questionnaire, or discussion as appropriate for fall prevention, nutrition, physical activity, Tobacco-use cessation, social engagement, weight loss and cognition.  Checklist reviewing preventive services available has been given to the patient.  Reviewed patient's diet, addressing concerns and/or questions.   She is at risk for psychosocial distress and has been provided with information to reduce risk.       FUTURE APPOINTMENTS:       - Follow-up for annual visit or as needed  See Patient Instructions    Amber Smyth is a 59 year old, presenting for the following:  Physical        10/24/2024     6:54 AM   Additional Questions   Roomed by Amita GRAHAM   Accompanied by " self         10/24/2024     6:54 AM   Patient Reported Additional Medications   Patient reports taking the following new medications None          Healthy Habits:     Getting at least 3 servings of Calcium per day:  Yes    Bi-annual eye exam:  Yes    Dental care twice a year:  Yes    Sleep apnea or symptoms of sleep apnea:  None    Diet:  Regular (no restrictions) and Gluten-free/reduced    Frequency of exercise:  4-5 days/week    Duration of exercise:  15-30 minutes    Taking medications regularly:  Not Applicable    Barriers to taking medications:  Not applicable    Medication side effects:  Not applicable    Additional concerns today:  No      Health Care Directive  Patient does not have a Health Care Directive: Patient states has Advance Directive and will bring in a copy to clinic.      10/19/2024   General Health   How would you rate your overall physical health? Good   Feel stress (tense, anxious, or unable to sleep) Only a little      (!) STRESS CONCERN      10/19/2024   Nutrition   Three or more servings of calcium each day? Yes   Diet: Gluten-free/reduced   How many servings of fruit and vegetables per day? (!) 0-1   How many sweetened beverages each day? 0-1            10/19/2024   Exercise   Days per week of moderate/strenous exercise 4 days   Average minutes spent exercising at this level 20 min            10/19/2024   Social Factors   Frequency of gathering with friends or relatives Once a week   Worry food won't last until get money to buy more No   Food not last or not have enough money for food? No   Do you have housing? (Housing is defined as stable permanent housing and does not include staying ouside in a car, in a tent, in an abandoned building, in an overnight shelter, or couch-surfing.) Yes   Are you worried about losing your housing? No   Lack of transportation? No   Unable to get utilities (heat,electricity)? No            10/19/2024   Fall Risk   Fallen 2 or more times in the past year? No     Trouble with walking or balance? No        Patient-reported          10/19/2024   Dental   Dentist two times every year? Yes            10/19/2024   TB Screening   Were you born outside of the [place]? No            Today's PHQ-2 Score:       10/24/2024     6:33 AM   PHQ-2 ( 1999 [employer])   Q1: Little interest or pleasure in doing things 0    Q2: Feeling down, depressed or hopeless 0    PHQ-2 Score 0    Q1: Little interest or pleasure in doing things Not at all   Q2: Feeling down, depressed or hopeless Not at all   PHQ-2 Score 0       Patient-reported           10/19/2024   Substance Use   Alcohol more than 3/day or more than 7/wk No   Do you use any other substances recreationally? No        Social History     Tobacco Use    Smoking status: Never     Passive exposure: Never    Smokeless tobacco: Never   Vaping Use    Vaping status: Never Used   Substance Use Topics    Alcohol use: Yes     Comment: occassionally     Drug use: No           9/25/2024   LAST FHS-7 RESULTS   1st degree relative breast or ovarian cancer No   Any relative bilateral breast cancer No   Any male have breast cancer No   Any ONE woman have BOTH breast AND ovarian cancer No   Any woman with breast cancer before 50yrs No   2 or more relatives with breast AND/OR ovarian cancer No   2 or more relatives with breast AND/OR bowel cancer No           Mammogram Screening - Mammogram every 1-2 years updated in Health Maintenance based on mutual decision making        10/19/2024   STI Screening   New sexual partner(s) since last STI/HIV test? No        History of abnormal Pap smear: No - age 30- 64 PAP with HPV every 5 years recommended        Latest Ref Rng & Units 6/4/2021     8:00 AM 6/4/2021     7:48 AM 6/4/2015     9:20 AM   PAP / HPV   PAP (Historical)   NIL     HPV 16 DNA NEG^Negative Negative   Negative    HPV 18 DNA NEG^Negative Negative   Negative    Other HR HPV NEG^Negative Negative   Negative      ASCVD Risk   The 10-year ASCVD risk  score (Marge SMITH, et al., 2019) is: 1.9%    Values used to calculate the score:      Age: 59 years      Sex: Female      Is Non- : No      Diabetic: No      Tobacco smoker: No      Systolic Blood Pressure: 106 mmHg      Is BP treated: No      HDL Cholesterol: 66 mg/dL      Total Cholesterol: 179 mg/dL           Reviewed and updated as needed this visit by Provider                    Past Medical History:   Diagnosis Date    Acute pain of left knee 09/21/2017    A: Knee still swollen. Possible meniscus tear   P:  MRI.      Arthritis 2012    osteo arthritis in hands    BPPV (benign paroxysmal positional vertigo) 11/07/2008    Connective tissue disease (H) 05/28/2009    Heart burn 09/21/2017    Last Assessment & Plan:      A: Pt describes a typical heart burn event for her but she developed jaw pain and felt a little sweaty/clammy. EKG shows T wave inversions in a non-specific pattern.    P: Advised pt to present to ED should an episode like this occur again. Will rule out CAD with dobutamine stress test. Unable to do exercise stress test due to knee pain.      Knee effusion, left 01/12/2018    Knee pain     Leukopenia     Major depressive disorder, single episode 02/14/2006    Epic      Other neutropenia (H)      Past Surgical History:   Procedure Laterality Date    ABDOMEN SURGERY  1987    apendectomy    APPENDECTOMY       Lab work is in process  Labs reviewed in EPIC  BP Readings from Last 3 Encounters:   10/24/24 106/70   06/29/23 107/69   05/24/23 109/72    Wt Readings from Last 3 Encounters:   10/24/24 76.3 kg (168 lb 4 oz)   06/29/23 72.8 kg (160 lb 9.6 oz)   05/24/23 72.9 kg (160 lb 12.8 oz)                  Patient Active Problem List   Diagnosis    Herpes simplex vulvovaginitis    CARDIOVASCULAR SCREENING; LDL GOAL LESS THAN 160    Family history of coronary artery disease    Overweight (BMI 25.0-29.9)    Uterine prolapse    Varicose veins of both lower extremities, unspecified whether  "complicated     Past Surgical History:   Procedure Laterality Date    ABDOMEN SURGERY  1987    apendectomy    APPENDECTOMY         Social History     Tobacco Use    Smoking status: Never     Passive exposure: Never    Smokeless tobacco: Never   Substance Use Topics    Alcohol use: Yes     Comment: occassionally      Family History   Problem Relation Age of Onset    Hypertension Mother         and brother    Cerebrovascular Disease Mother     Arthritis Father     C.A.D. Brother 50        CABG at age 50    Diabetes Brother     Depression Sister     Asthma Sister          No current outpatient medications on file.     No Known Allergies    CONSTITUTIONAL:NEGATIVE for fever, chills, change in weight  INTEGUMENTARY/SKIN: NEGATIVE for worrisome rashes, moles or lesions  EYES: NEGATIVE for vision changes or irritation  ENT: NEGATIVE for ear, mouth and throat problems  RESP:NEGATIVE for significant cough or SOB  BREAST: NEGATIVE for masses, tenderness or discharge  CV: NEGATIVE for chest pain, palpitations or peripheral edema  GI: NEGATIVE for nausea, abdominal pain, heartburn, or change in bowel habits   menopausal female: amenorrhea, no unusual urinary symptoms, and no unusual vaginal symptoms. Some uterine prolapse and saw GYN for this and some thought of possible PT and physical therapy   MUSCULOSKELETAL:NEGATIVE for significant arthralgias or myalgia  NEURO: NEGATIVE for weakness, dizziness or paresthesias  ENDOCRINE: NEGATIVE for temperature intolerance, skin/hair changes  HEME/ALLERGY/IMMUNE: POSITIVE  for allergies and NEGATIVE for swollen nodes and weight loss  PSYCHIATRIC: NEGATIVE for changes in mood or affect            Objective    Exam  /70 (BP Location: Right arm, Patient Position: Sitting, Cuff Size: Adult Large)   Pulse 72   Temp 96.8  F (36  C) (Temporal)   Resp 14   Ht 1.619 m (5' 3.75\")   Wt 76.3 kg (168 lb 4 oz)   LMP 12/16/2011   SpO2 100%   BMI 29.11 kg/m     Estimated body mass index " "is 29.11 kg/m  as calculated from the following:    Height as of this encounter: 1.619 m (5' 3.75\").    Weight as of this encounter: 76.3 kg (168 lb 4 oz).    Physical Exam  GENERAL: alert and no distress  EYES: Eyes grossly normal to inspection and conjunctivae and sclerae normal  HENT: ear canals and TM's normal, nose and mouth without ulcers or lesions  NECK: no adenopathy, no asymmetry, masses, or scars  RESP: lungs clear to auscultation - no rales, rhonchi or wheezes  BREAST: normal without masses, tenderness or nipple discharge and no palpable axillary masses or adenopathy  CV: regular rates and rhythm, no murmur, click or rub, peripheral pulses strong, and no peripheral edema  ABDOMEN: soft, nontender, no hepatosplenomegaly, no masses and bowel sounds normal   (female): deferred  MS: no gross musculoskeletal defects noted, no edema  SKIN: no suspicious lesions or rashes  NEURO: Normal strength and tone, mentation intact and speech normal  PSYCH: mentation appears normal, affect normal/bright  LYMPH: no cervical, supraclavicular, axillary, or inguinal adenopathy  EXTREMITIES: Good pulses. Good range of motion.  No edema. Normal reflexes. Diffuse, blanching varicosities bilateraly.     Signed Electronically by: BENNIE Castillo CNP    "

## 2024-10-25 ENCOUNTER — PATIENT OUTREACH (OUTPATIENT)
Dept: CARE COORDINATION | Facility: CLINIC | Age: 59
End: 2024-10-25
Payer: COMMERCIAL

## 2024-10-28 NOTE — RESULT ENCOUNTER NOTE
Judson Pena,    Attached are your test results.  -LDL(bad) cholesterol level is elevated which can increase your heart disease risk.  A diet high in fat and simple carbohydrates, genetics and being overweight can contribute to this. ADVISE: exercising 150 minutes of aerobic exercise per week (30 minutes for 5 days per week or 50 minutes for 3 days per week are options) and eating a low saturated fat/low carbohydrate diet are helpful to improve this. In 12 months, you should recheck your fasting cholesterol panel by scheduling a lab-only appointment.  -Liver and gallbladder tests (ALT,AST, Alk phos,bilirubin) are normal.  -Kidney function (GFR) is normal.  -Sodium is normal.  -Potassium is normal.  -Calcium is normal.  -Glucose is slight elevated and may be a sign of early diabetes (prediabetes). ADVISE:: eating a low carbohydrate diet, exercising, trying to lose weight (if necessary) and rechecking your glucose level in 12 months.  -TSH (thyroid stimulating hormone) level is normal which indicates normal thyroid function.   Please contact us if you have any questions.    Chyna Hunter, CNP

## 2025-02-11 ENCOUNTER — E-VISIT (OUTPATIENT)
Dept: URGENT CARE | Facility: CLINIC | Age: 60
End: 2025-02-11
Payer: COMMERCIAL

## 2025-02-11 DIAGNOSIS — N89.8 VAGINAL DISCHARGE: Primary | ICD-10-CM

## 2025-02-11 NOTE — PATIENT INSTRUCTIONS

## 2025-02-12 ENCOUNTER — LAB (OUTPATIENT)
Dept: LAB | Facility: CLINIC | Age: 60
End: 2025-02-12
Payer: COMMERCIAL

## 2025-02-12 DIAGNOSIS — N89.8 VAGINAL DISCHARGE: ICD-10-CM

## 2025-02-12 LAB
CLUE CELLS: ABNORMAL
TRICHOMONAS, WET PREP: ABNORMAL
WBC'S/HIGH POWER FIELD, WET PREP: ABNORMAL
YEAST, WET PREP: ABNORMAL

## 2025-02-12 PROCEDURE — 87210 SMEAR WET MOUNT SALINE/INK: CPT

## 2025-08-26 ENCOUNTER — PATIENT OUTREACH (OUTPATIENT)
Dept: CARE COORDINATION | Facility: CLINIC | Age: 60
End: 2025-08-26
Payer: COMMERCIAL